# Patient Record
Sex: FEMALE | Race: WHITE | NOT HISPANIC OR LATINO | Employment: FULL TIME | URBAN - METROPOLITAN AREA
[De-identification: names, ages, dates, MRNs, and addresses within clinical notes are randomized per-mention and may not be internally consistent; named-entity substitution may affect disease eponyms.]

---

## 2017-04-24 ENCOUNTER — TELEPHONE (OUTPATIENT)
Dept: FAMILY MEDICINE | Facility: CLINIC | Age: 52
End: 2017-04-24

## 2017-04-24 DIAGNOSIS — Z12.11 SCREEN FOR COLON CANCER: Primary | ICD-10-CM

## 2017-05-02 ENCOUNTER — OFFICE VISIT (OUTPATIENT)
Dept: FAMILY MEDICINE | Facility: CLINIC | Age: 52
End: 2017-05-02
Payer: COMMERCIAL

## 2017-05-02 VITALS
TEMPERATURE: 97.1 F | WEIGHT: 200 LBS | BODY MASS INDEX: 32.14 KG/M2 | SYSTOLIC BLOOD PRESSURE: 132 MMHG | DIASTOLIC BLOOD PRESSURE: 70 MMHG | HEIGHT: 66 IN | OXYGEN SATURATION: 98 % | HEART RATE: 71 BPM

## 2017-05-02 DIAGNOSIS — R07.0 THROAT PAIN: Primary | ICD-10-CM

## 2017-05-02 LAB
DEPRECATED S PYO AG THROAT QL EIA: NORMAL
MICRO REPORT STATUS: NORMAL
SPECIMEN SOURCE: NORMAL

## 2017-05-02 PROCEDURE — 87081 CULTURE SCREEN ONLY: CPT | Performed by: FAMILY MEDICINE

## 2017-05-02 PROCEDURE — 87880 STREP A ASSAY W/OPTIC: CPT | Performed by: FAMILY MEDICINE

## 2017-05-02 PROCEDURE — 99213 OFFICE O/P EST LOW 20 MIN: CPT | Performed by: FAMILY MEDICINE

## 2017-05-02 NOTE — LETTER
[facility]  [address] [facility] [place] [address]    May 3, 2017    Yessica SCHNEIDER Sabine  [address] [place], [place] [address]E [facility] [address]          Dear Yessica,  Your strep culture was negative. I wish you well.   Enclosed is a copy of your results.     Results for orders placed or performed in visit on 05/02/17   Rapid strep screen   Result Value Ref Range    Specimen Description Throat     Rapid Strep A Screen       NEGATIVE: No Group A streptococcal antigen detected by immunoassay, await   culture report.      Micro Report Status FINAL 05/02/2017    Beta strep group A culture   Result Value Ref Range    Specimen Description Throat     Culture Micro No Beta Streptococcus isolated     Micro Report Status FINAL 05/03/2017        If you have any questions or concerns, please call myself or my nurse at [phone].      Sincerely,        Jacquelin Nunez MD/pb

## 2017-05-02 NOTE — NURSING NOTE
"Chief Complaint   Patient presents with     Pharyngitis       Initial /70 (BP Location: Right arm, Patient Position: Chair, Cuff Size: Adult Large)  Pulse 71  Temp 97.1  F (36.2  C)  Ht 5' 6\" (1.676 m)  Wt 200 lb (90.7 kg)  LMP 12/20/2010  SpO2 98%  BMI 32.28 kg/m2 Estimated body mass index is 32.28 kg/(m^2) as calculated from the following:    Height as of this encounter: 5' 6\" (1.676 m).    Weight as of this encounter: 200 lb (90.7 kg).  Medication Reconciliation: complete   Coty Curiel MA      "

## 2017-05-02 NOTE — LETTER
[facility]  [address] [place] [address]  [phone]      May 2, 2017      Yessica Regalado  [address] [facility] [address]        Re Yessica:    Patient was seen by me today. Please excuse her from work today.           Sincerely,      BETH AVALOS M.D.

## 2017-05-02 NOTE — MR AVS SNAPSHOT
After Visit Summary   5/2/2017    Yessica Regalado    MRN: [id number]           Patient Information     Date Of Birth          1965        Visit Information        Provider Department      5/2/2017 9:45 AM Jacquelin Nunez MD [facility]        Today's Diagnoses     Throat pain    -  1      Care Instructions    [facility]    If you have any questions regarding to your visit please contact your care team:       Team Purple:   Clinic Hours Telephone Number   JACKIE Soriano Dr., Dr.   7am-7pm  Monday - Thursday   7am-5pm  Fridays  [phone]  (Appointment scheduling available 24/7)    Questions about your Visit?   Team Line:  [phone]   Urgent Care - [place] and [facility] - 11am-9pm Monday-Friday Saturday-Sunday- 9am-5pm   [place] - 5pm-9pm Monday-Friday Saturday-Sunday- 9am-5pm  [phone] - [facility]  [phone] - [place]       What options do I have for visits at the clinic other than the traditional office visit?  To expand how we care for you, many of our providers are utilizing electronic visits (e-visits) and telephone visits, when medically appropriate, for interactions with their patients rather than a visit in the clinic.   We also offer nurse visits for many medical concerns. Just like any other service, we will bill your insurance company for this type of visit based on time spent on the phone with your provider. Not all insurance companies cover these visits. Please check with your medical insurance if this type of visit is covered. You will be responsible for any charges that are not paid by your insurance.      E-visits via StandDesk:  generally incur a $35.00 fee.  Telephone visits:  Time spent on the phone: *charged based on time that is spent on the phone in increments of 10 minutes. Estimated cost:   5-10 mins $30.00   11-20 mins. $59.00   21-30 mins. $85.00     Use  "MyChart (secure email communication and access to your chart) to send your primary care provider a message or make an appointment. Ask someone on your Team how to sign up for MobileHandshakehart.  For a Price Quote for your services, please call our Consumer Price Line at [phone].  As always, Thank you for trusting us with your health care needs!            Follow-ups after your visit        Who to contact     If you have questions or need follow up information about today's clinic visit or your schedule please contact [facility] MARY ALICE directly at [phone].  Normal or non-critical lab and imaging results will be communicated to you by MyChart, letter or phone within 4 business days after the clinic has received the results. If you do not hear from us within 7 days, please contact the clinic through MobileHandshakehart or phone. If you have a critical or abnormal lab result, we will notify you by phone as soon as possible.  Submit refill requests through KIXEYE or call your pharmacy and they will forward the refill request to us. Please allow 3 business days for your refill to be completed.          Additional Information About Your Visit        MyChart Information     MobileHandshakehart lets you send messages to your doctor, view your test results, renew your prescriptions, schedule appointments and more. To sign up, go to [email or web address] . Click on \"Log in\" on the left side of the screen, which will take you to the Welcome page. Then click on \"Sign up Now\" on the right side of the page.     You will be asked to enter the access code listed below, as well as some personal information. Please follow the directions to create your username and password.     Your access code is: [email or web address]  Expires: 2017 10:07 AM     Your access code will  in 90 days. If you need help or a new code, please call your [facility] or [phone].        Care EveryWhere ID     This is your Care EveryWhere ID. This could be used by " "other organizations to access your [facility] medical records  [id number]        Your Vitals Were     Pulse Temperature Height Last Period Pulse Oximetry BMI (Body Mass Index)    71 97.1  F (36.2  C) 5' 6\" (1.676 m) 12/20/2010 98% 32.28 kg/m2       Blood Pressure from Last 3 Encounters:   05/02/17 132/70   11/18/16 120/77   10/07/15 132/83    Weight from Last 3 Encounters:   05/02/17 200 lb (90.7 kg)   11/18/16 195 lb (88.5 kg)   10/07/15 188 lb (85.3 kg)              We Performed the Following     Beta strep group A culture     Rapid strep screen        Primary Care Provider Office Phone # Fax #    Danika Hammond [phone]       [address] [address]        Thank you!     Thank you for choosing [facility]  for your care. Our goal is always to provide you with excellent care. Hearing back from our patients is one way we can continue to improve our services. Please take a few minutes to complete the written survey that you may receive in the mail after your visit with us. Thank you!             Your Updated Medication List - Protect others around you: Learn how to safely use, store and throw away your medicines at www.disposemymeds.org.          This list is accurate as of: 5/2/17 10:07 AM.  Always use your most recent med list.                   Brand Name Dispense Instructions for use    BUSPAR 30 MG Tabs   Generic drug:  BusPIRone HCl      1 TABLET TWICE DAILY       MULTIVITAMIN TABS   OR      1 TABLET DAILY       PAXIL 40 MG tablet   Generic drug:  PARoxetine      1 1/2 TABLETS DAILY       simvastatin 20 MG tablet    ZOCOR    45 tablet    TAKE ONE-HALF TABLET BY MOUTH EVERY NIGHT AT BEDTIME       WELLBUTRIN  MG 24 hr tablet   Generic drug:  buPROPion      1 TABLET DAILY         "

## 2017-05-02 NOTE — PROGRESS NOTES
SUBJECTIVE:                                                    Yessica Regalado is a 52 year old female who presents to clinic today for the following health issues:      ENT Symptoms             Symptoms: cc Present Absent Comment   Fever/Chills   x    Fatigue  x     Muscle Aches  x     Eye Irritation   x    Sneezing   x    Nasal Juice/Drg   x    Sinus Pressure/Pain   x    Loss of smell   x    Dental pain   x    Sore Throat  x     Swollen Glands   x    Ear Pain/Fullness   x    Cough   x    Wheeze   x    Chest Pain   x    Shortness of breath   x    Rash       Other         Symptom duration:  2 weeks off and on   Symptom severity:  severe   Treatments tried:  none   Contacts:  no              Problem list and histories reviewed & adjusted, as indicated.  Additional history: as documented    Patient Active Problem List   Diagnosis     Hyperlipidemia LDL goal <160     Moderate major depression (H)     Anxiety     Lipoma     Tobacco abuse     Elevated blood pressure reading without diagnosis of hypertension     Past Surgical History:   Procedure Laterality Date     ARTHROSCOPY KNEE RT/LT  07/99    right     C NONSPECIFIC PROCEDURE      elbow fracture ORIF     C NONSPECIFIC PROCEDURE  2002    cyst removal Left forearm     HYSTERECTOMY, PAP NO LONGER INDICATED      Menorrhagia     HYSTERECTOMY, VAGINAL  12/11    partial. no cancer.     TONSILLECTOMY & ADENOIDECTOMY         Social History   Substance Use Topics     Smoking status: Former Smoker     Packs/day: 1.00     Years: 14.00     Types: Cigarettes     Quit date: 1/1/2017     Smokeless tobacco: Former User      Comment: 9/1/10     Alcohol use No     Family History   Problem Relation Age of Onset     Hypertension Mother      DIABETES Mother      Depression Mother      Lipids Mother      OSTEOPOROSIS Mother      Hypertension Father      DIABETES Father      Lipids Father      DIABETES Maternal Grandmother      CANCER Paternal Grandfather      BONE     Allergies Son       "Asthma Son      Allergies Daughter          Current Outpatient Prescriptions   Medication Sig Dispense Refill     simvastatin (ZOCOR) 20 MG tablet TAKE ONE-HALF TABLET BY MOUTH EVERY NIGHT AT BEDTIME 45 tablet 3     WELLBUTRIN XL# 300 MG OR TB24 1 TABLET DAILY       BUSPAR 30 MG OR TABS 1 TABLET TWICE DAILY       PAXIL 40 MG OR TABS  1 1/2 TABLETS DAILY       MULTIVITAMIN TABS   OR 1 TABLET DAILY       BP Readings from Last 3 Encounters:   05/02/17 132/70   11/18/16 120/77   10/07/15 132/83    Wt Readings from Last 3 Encounters:   05/02/17 200 lb (90.7 kg)   11/18/16 195 lb (88.5 kg)   10/07/15 188 lb (85.3 kg)                  Labs reviewed in EPIC    Reviewed and updated as needed this visit by clinical staff  Tobacco  Allergies  Meds  Med Hx  Surg Hx  Fam Hx  Soc Hx      Reviewed and updated as needed this visit by Provider         ROS:  This 52 year old female is here today because she has had a sore throat for awhile. Seems worse the past 24 hours. No fevers. She works in [employer]. No aware of any seasonal allergies. Able to swallow. All other review of systems are negative  Personal, family, and social history reviewed with patient and revised.         OBJECTIVE:                                                    /70 (BP Location: Right arm, Patient Position: Chair, Cuff Size: Adult Large)  Pulse 71  Temp 97.1  F (36.2  C)  Ht 5' 6\" (1.676 m)  Wt 200 lb (90.7 kg)  LMP 12/20/2010  SpO2 98%  BMI 32.28 kg/m2  Body mass index is 32.28 kg/(m^2).  GENERAL: healthy, alert and no distress  EYES: Eyes grossly normal to inspection, PERRL and conjunctivae and sclerae normal  HENT: ear canals and TM's normal, nose and mouth without ulcers or lesions, but her uvula is a little red and swollen  NECK: no adenopathy, no asymmetry, masses, or scars and thyroid normal to palpation  RESP: lungs clear to auscultation - no rales, rhonchi or wheezes  CV: regular rate and rhythm, normal S1 S2, no S3 or S4, no " murmur, click or rub, no peripheral edema   MS: no gross musculoskeletal defects noted, no edema    Diagnostic Test Results:  Results for orders placed or performed in visit on 05/02/17 (from the past 24 hour(s))   Rapid strep screen   Result Value Ref Range    Specimen Description Throat     Rapid Strep A Screen       NEGATIVE: No Group A streptococcal antigen detected by immunoassay, await   culture report.      Micro Report Status FINAL 05/02/2017         ASSESSMENT/PLAN:                                                             1. Throat pain  As above, could be related to allergies or a viral illenss  - Rapid strep screen  - Beta strep group A culture  Off work today    Return to clinic if no improvement     BETH AVALOS MD  [facility]

## 2017-05-02 NOTE — PATIENT INSTRUCTIONS
[facility]    If you have any questions regarding to your visit please contact your care team:       Team Purple:   Clinic Hours Telephone Number   JACKIE Soriano Dr., Dr.   7am-7pm  Monday - Thursday   7am-5pm  Fridays  [phone]  (Appointment scheduling available 24/7)    Questions about your Visit?   Team Line:  [phone]   Urgent Care - [place] and [facility] - 11am-9pm Monday-Friday Saturday-Sunday- 9am-5pm   [place] - 5pm-9pm Monday-Friday Saturday-Sunday- 9am-5pm  [phone] - [facility]  [phone] - [place]       What options do I have for visits at the clinic other than the traditional office visit?  To expand how we care for you, many of our providers are utilizing electronic visits (e-visits) and telephone visits, when medically appropriate, for interactions with their patients rather than a visit in the clinic.   We also offer nurse visits for many medical concerns. Just like any other service, we will bill your insurance company for this type of visit based on time spent on the phone with your provider. Not all insurance companies cover these visits. Please check with your medical insurance if this type of visit is covered. You will be responsible for any charges that are not paid by your insurance.      E-visits via [employer]:  generally incur a $35.00 fee.  Telephone visits:  Time spent on the phone: *charged based on time that is spent on the phone in increments of 10 minutes. Estimated cost:   5-10 mins $30.00   11-20 mins. $59.00   21-30 mins. $85.00     Use UClasst (secure email communication and access to your chart) to send your primary care provider a message or make an appointment. Ask someone on your Team how to sign up for [employer].  For a Price Quote for your services, please call our Consumer Price Line at [phone].  As always, Thank you for trusting us with your health care needs!

## 2017-05-03 LAB
BACTERIA SPEC CULT: NORMAL
MICRO REPORT STATUS: NORMAL
SPECIMEN SOURCE: NORMAL

## 2017-05-03 ASSESSMENT — PATIENT HEALTH QUESTIONNAIRE - PHQ9: SUM OF ALL RESPONSES TO PHQ QUESTIONS 1-9: 1

## 2017-09-28 ENCOUNTER — TELEPHONE (OUTPATIENT)
Dept: FAMILY MEDICINE | Facility: CLINIC | Age: 52
End: 2017-09-28

## 2017-09-28 NOTE — TELEPHONE ENCOUNTER
Panel Management Review      Patient has the following on her problem list: None      Composite cancer screening  Chart review shows that this patient is due/due soon for the following Colonoscopy and Fecal Colorectal (FIT)  Summary:    Patient is due/failing the following:   COLONOSCOPY and FIT    Action needed:   FIT test was mailed to patient.     Type of outreach:    None    Questions for provider review:    None                                                                                                                                    Erin Hook MA        Chart routed to  .

## 2017-11-29 ENCOUNTER — TELEPHONE (OUTPATIENT)
Dept: FAMILY MEDICINE | Facility: CLINIC | Age: 52
End: 2017-11-29

## 2017-11-29 NOTE — LETTER
November 29, 2017          Yessica Regalado,  [address] [facility] [address]        Dear Yessica Regalado      Monitoring and managing your preventative and chronic health conditions are very important to us. Our records indicate that you have not scheduled for FIT test and Depression Check  which was recommended by Danika Brand.      If you have received your health care elsewhere, please call the clinic so the information can be documented in your chart.    Please call [phone] or message us through your [employer] account to schedule an appointment or provide information for your chart.     Feel free to contact us if you have any questions or concerns!    I look forward to seeing you and working with you on your health care needs.     Sincerely,         Danika Hammond / Stone Hewitt

## 2017-11-29 NOTE — TELEPHONE ENCOUNTER
Panel Management Review      Patient has the following on her problem list: None      Composite cancer screening  Chart review shows that this patient is due/due soon for the following Fecal Colorectal (FIT)  Summary:    Patient is due/failing the following:   FIT and PHQ9    Action needed:   Routed to provider for review.    Type of outreach:    Sent letter.    Questions for provider review:    None                                                                                                                                    Stone Hewitt       Chart routed to Care Team .

## 2017-11-29 NOTE — PATIENT INSTRUCTIONS
[facility]    If you have any questions regarding to your visit please contact your care team:       Team Red:   Clinic Hours Telephone Number   Dr. Kristi Munguia  (pediatrics)  Vicki Villalta NP 7am-7pm  Monday - Thursday   7am-5pm  Fridays  [phone] (fax)    Frank BOLTON  [phone]   Urgent Care - [place] and [place] Monday-Friday  [place] - 11am-8pm  Saturday-Sunday  Both sites - 9am-5pm  [phone] - [facility]  [phone] - [place]       What options do I have for visits at the clinic other than the traditional office visit?  To expand how we care for you, many of our providers are utilizing electronic visits (e-visits) and telephone visits, when medically appropriate, for interactions with their patients rather than a visit in the clinic.   We also offer nurse visits for many medical concerns. Just like any other service, we will bill your insurance company for this type of visit based on time spent on the phone with your provider. Not all insurance companies cover these visits. Please check with your medical insurance if this type of visit is covered. You will be responsible for any charges that are not paid by your insurance.      E-visits via Opt[employer]ron:  generally incur a $35.00 fee.  Telephone visits:  Time spent on the phone: *charged based on time that is spent on the phone in increments of 10 minutes. Estimated cost:   5-10 mins $30.00   11-20 mins. $59.00   21-30 mins. $85.00     As always, Thank you for trusting us with your health care needs!              Preventive Health Recommendations  Female Ages 50 - 64    Yearly exam: See your health care provider every year in order to  o Review health changes.   o Discuss preventive care.    o Review your medicines if your doctor has prescribed any.      Get a Pap test every three years (unless you have an abnormal result and your provider advises testing more  often).    If you get Pap tests with HPV test, you only need to test every 5 years, unless you have an abnormal result.     You do not need a Pap test if your uterus was removed (hysterectomy) and you have not had cancer.    You should be tested each year for STDs (sexually transmitted diseases) if you're at risk.     Have a mammogram every 1 to 2 years.    Have a colonoscopy at age 50, or have a yearly FIT test (stool test). These exams screen for colon cancer.      Have a cholesterol test every 5 years, or more often if advised.    Have a diabetes test (fasting glucose) every three years. If you are at risk for diabetes, you should have this test more often.     If you are at risk for osteoporosis (brittle bone disease), think about having a bone density scan (DEXA).    Shots: Get a flu shot each year. Get a tetanus shot every 10 years.    Nutrition:     Eat at least 5 servings of fruits and vegetables each day.    Eat whole-grain bread, whole-wheat pasta and brown rice instead of white grains and rice.    Talk to your provider about Calcium and Vitamin D.     Lifestyle    Exercise at least 150 minutes a week (30 minutes a day, 5 days a week). This will help you control your weight and prevent disease.    Limit alcohol to one drink per day.    No smoking.     Wear sunscreen to prevent skin cancer.     See your dentist every six months for an exam and cleaning.    See your eye doctor every 1 to 2 years.  Discharged by OTTONIEL Ahmadi

## 2017-12-01 ENCOUNTER — OFFICE VISIT (OUTPATIENT)
Dept: FAMILY MEDICINE | Facility: CLINIC | Age: 52
End: 2017-12-01
Payer: COMMERCIAL

## 2017-12-01 ENCOUNTER — RADIANT APPOINTMENT (OUTPATIENT)
Dept: MAMMOGRAPHY | Facility: CLINIC | Age: 52
End: 2017-12-01
Payer: COMMERCIAL

## 2017-12-01 VITALS
SYSTOLIC BLOOD PRESSURE: 122 MMHG | HEART RATE: 83 BPM | HEIGHT: 66 IN | RESPIRATION RATE: 15 BRPM | WEIGHT: 203 LBS | BODY MASS INDEX: 32.62 KG/M2 | DIASTOLIC BLOOD PRESSURE: 80 MMHG | OXYGEN SATURATION: 96 % | TEMPERATURE: 97.1 F

## 2017-12-01 DIAGNOSIS — E78.5 HYPERLIPIDEMIA LDL GOAL <160: ICD-10-CM

## 2017-12-01 DIAGNOSIS — Z12.31 VISIT FOR SCREENING MAMMOGRAM: ICD-10-CM

## 2017-12-01 DIAGNOSIS — Z00.00 ROUTINE GENERAL MEDICAL EXAMINATION AT A HEALTH CARE FACILITY: Primary | ICD-10-CM

## 2017-12-01 DIAGNOSIS — Z87.891 EX-SMOKER: ICD-10-CM

## 2017-12-01 DIAGNOSIS — Z12.11 SCREEN FOR COLON CANCER: ICD-10-CM

## 2017-12-01 DIAGNOSIS — Z23 NEED FOR PROPHYLACTIC VACCINATION AND INOCULATION AGAINST INFLUENZA: ICD-10-CM

## 2017-12-01 LAB
CHOLEST SERPL-MCNC: 198 MG/DL
GLUCOSE SERPL-MCNC: 99 MG/DL (ref 70–99)
HDLC SERPL-MCNC: 60 MG/DL
LDLC SERPL CALC-MCNC: 107 MG/DL
NONHDLC SERPL-MCNC: 138 MG/DL
TRIGL SERPL-MCNC: 156 MG/DL

## 2017-12-01 PROCEDURE — 82947 ASSAY GLUCOSE BLOOD QUANT: CPT | Performed by: FAMILY MEDICINE

## 2017-12-01 PROCEDURE — 80061 LIPID PANEL: CPT | Performed by: FAMILY MEDICINE

## 2017-12-01 PROCEDURE — 99396 PREV VISIT EST AGE 40-64: CPT | Performed by: FAMILY MEDICINE

## 2017-12-01 PROCEDURE — G0202 SCR MAMMO BI INCL CAD: HCPCS | Mod: TC

## 2017-12-01 PROCEDURE — 36415 COLL VENOUS BLD VENIPUNCTURE: CPT | Performed by: FAMILY MEDICINE

## 2017-12-01 RX ORDER — SIMVASTATIN 20 MG
TABLET ORAL
Qty: 45 TABLET | Refills: 3 | Status: SHIPPED | OUTPATIENT
Start: 2017-12-01 | End: 2018-03-26

## 2017-12-01 ASSESSMENT — PATIENT HEALTH QUESTIONNAIRE - PHQ9
SUM OF ALL RESPONSES TO PHQ QUESTIONS 1-9: 0
5. POOR APPETITE OR OVEREATING: NOT AT ALL

## 2017-12-01 ASSESSMENT — ANXIETY QUESTIONNAIRES
7. FEELING AFRAID AS IF SOMETHING AWFUL MIGHT HAPPEN: NOT AT ALL
1. FEELING NERVOUS, ANXIOUS, OR ON EDGE: NOT AT ALL
5. BEING SO RESTLESS THAT IT IS HARD TO SIT STILL: NOT AT ALL
3. WORRYING TOO MUCH ABOUT DIFFERENT THINGS: NOT AT ALL
IF YOU CHECKED OFF ANY PROBLEMS ON THIS QUESTIONNAIRE, HOW DIFFICULT HAVE THESE PROBLEMS MADE IT FOR YOU TO DO YOUR WORK, TAKE CARE OF THINGS AT HOME, OR GET ALONG WITH OTHER PEOPLE: NOT DIFFICULT AT ALL
2. NOT BEING ABLE TO STOP OR CONTROL WORRYING: NOT AT ALL
6. BECOMING EASILY ANNOYED OR IRRITABLE: NOT AT ALL
GAD7 TOTAL SCORE: 0

## 2017-12-01 NOTE — LETTER
[facility]  [address] [place] [address]    December 4, 2017    Yessica SCHNEIDER Sabine  [address] [facility] [address]          Dear Yessica,    Cholesterol and Blood sugar is good   Take care    Enclosed is a copy of your results.     Results for orders placed or performed in visit on 12/01/17   Lipid panel reflex to direct LDL Fasting   Result Value Ref Range    Cholesterol 198 <200 mg/dL    Triglycerides 156 (H) <150 mg/dL    HDL Cholesterol 60 >49 mg/dL    LDL Cholesterol Calculated 107 (H) <100 mg/dL    Non HDL Cholesterol 138 (H) <130 mg/dL   Glucose   Result Value Ref Range    Glucose 99 70 - 99 mg/dL       If you have any questions or concerns, please call myself or my nurse at [phone].      Sincerely,        Danika Hammond MD/mckinley

## 2017-12-01 NOTE — PROGRESS NOTES
SUBJECTIVE:   CC: Yessica Regalado is an 52 year old woman who presents for preventive health visit.     Healthy Habits:    Do you get at least three servings of calcium containing foods daily (dairy, green leafy vegetables, etc.)? yes    Amount of exercise or daily activities, outside of work: no    Problems taking medications regularly No    Medication side effects: No    Have you had an eye exam in the past two years? yes    Do you see a dentist twice per year? yes    Do you have sleep apnea, excessive snoring or daytime drowsiness?no        Depression is stable -she sees Psych-see PHQ9  She is doing well with statins  No muscle aches      Today's PHQ-2 Score: PHQ-2 ( 1999 Pfizer) 5/2/2017 10/7/2015   Q1: Little interest or pleasure in doing things 0 0   Q2: Feeling down, depressed or hopeless 0 0   PHQ-2 Score 0 0         Abuse: Current or Past(Physical, Sexual or Emotional)- No  Do you feel safe in your environment - Yes  Social History   Substance Use Topics     Smoking status: Former Smoker     Packs/day: 1.00     Years: 14.00     Types: Cigarettes     Quit date: 1/1/2017     Smokeless tobacco: Former User      Comment: 9/1/10     Alcohol use No     The patient does not drink >3 drinks per day nor >7 drinks per week.    Reviewed orders with patient.  Reviewed health maintenance and updated orders accordingly - Yes  Labs reviewed in EPIC  BP Readings from Last 3 Encounters:   12/01/17 122/80   05/02/17 132/70   11/18/16 120/77    Wt Readings from Last 3 Encounters:   12/01/17 203 lb (92.1 kg)   05/02/17 200 lb (90.7 kg)   11/18/16 195 lb (88.5 kg)                  Patient Active Problem List   Diagnosis     Hyperlipidemia LDL goal <160     Moderate major depression (H)     Anxiety     Lipoma     Elevated blood pressure reading without diagnosis of hypertension     Ex-smoker     Past Surgical History:   Procedure Laterality Date     ARTHROSCOPY KNEE RT/LT  07/99    right     C NONSPECIFIC PROCEDURE      elbow  fracture ORIF     C NONSPECIFIC PROCEDURE  2002    cyst removal Left forearm     HYSTERECTOMY, PAP NO LONGER INDICATED      Menorrhagia     HYSTERECTOMY, VAGINAL  12/11    partial. no cancer.     TONSILLECTOMY & ADENOIDECTOMY         Social History   Substance Use Topics     Smoking status: Former Smoker     Packs/day: 1.00     Years: 14.00     Types: Cigarettes     Quit date: 1/1/2017     Smokeless tobacco: Former User      Comment: 9/1/10     Alcohol use No     Family History   Problem Relation Age of Onset     Hypertension Mother      DIABETES Mother      Depression Mother      Lipids Mother      OSTEOPOROSIS Mother      Hypertension Father      DIABETES Father      Lipids Father      DIABETES Maternal Grandmother      CANCER Paternal Grandfather      BONE     Allergies Son      Asthma Son      Allergies Daughter          Current Outpatient Prescriptions   Medication Sig Dispense Refill     simvastatin (ZOCOR) 20 MG tablet TAKE ONE-HALF TABLET BY MOUTH EVERY NIGHT AT BEDTIME 45 tablet 3     WELLBUTRIN XL# 300 MG OR TB24 1 TABLET DAILY       BUSPAR 30 MG OR TABS 1 TABLET TWICE DAILY       PAXIL 40 MG OR TABS  1 1/2 TABLETS DAILY       MULTIVITAMIN TABS   OR 1 TABLET DAILY       No Known Allergies  Recent Labs   Lab Test  11/18/16   0805  09/30/15   0717  09/17/14   0700  03/27/13   0702  02/21/12   0739   LDL  118*  134*  133*  116  147*   HDL  48*  49*  47*  44*  41*   TRIG  175*  172*  149  136  160*   ALT  37  43  35  42  35   TSH   --    --    --   1.34  1.41              Patient over age 50, mutual decision to screen reflected in health maintenance.      Pertinent mammograms are reviewed under the imaging tab.  History of abnormal Pap smear: NO - age 30- 65 PAP every 3 years recommended    Reviewed and updated as needed this visit by clinical staffTobacco  Allergies  Meds  Problems         Reviewed and updated as needed this visit by Provider        Past Medical History:   Diagnosis Date     Alcoholic (H)  "    recovering sober since      Anxiety     sees NP psychiatry in [place].     Ex-cigarette smoker      Hyperlipidemia LDL goal <160      Lipoma      Moderate major depression (H)     sees NP psychiatry in [place].      (normal spontaneous vaginal delivery)       Past Surgical History:   Procedure Laterality Date     ARTHROSCOPY KNEE RT/LT      right     C NONSPECIFIC PROCEDURE      elbow fracture ORIF     C NONSPECIFIC PROCEDURE      cyst removal Left forearm     HYSTERECTOMY, PAP NO LONGER INDICATED      Menorrhagia     HYSTERECTOMY, VAGINAL      partial. no cancer.     TONSILLECTOMY & ADENOIDECTOMY         ROS:  C: NEGATIVE for fever, chills, change in weight  I: NEGATIVE for worrisome rashes, moles or lesions  E: NEGATIVE for vision changes or irritation  ENT: NEGATIVE for ear, mouth and throat problems  R: NEGATIVE for significant cough or SOB  B: NEGATIVE for masses, tenderness or discharge  CV: NEGATIVE for chest pain, palpitations or peripheral edema  GI: NEGATIVE for nausea, abdominal pain, heartburn, or change in bowel habits  : NEGATIVE for unusual urinary or vaginal symptoms. No vaginal bleeding.  M: NEGATIVE for significant arthralgias or myalgia  N: NEGATIVE for weakness, dizziness or paresthesias  P: NEGATIVE for changes in mood or affect     OBJECTIVE:   Pulse 83  Temp 97.1  F (36.2  C)  Resp 15  Ht 5' 6\" (1.676 m)  Wt 203 lb (92.1 kg)  LMP 2010  SpO2 96%  BMI 32.77 kg/m2  EXAM:  GENERAL: healthy, alert and no distress  EYES: Eyes grossly normal to inspection, PERRL and conjunctivae and sclerae normal  HENT: ear canals and TM's normal, nose and mouth without ulcers or lesions  NECK: no adenopathy, no asymmetry, masses, or scars and thyroid normal to palpation  RESP: lungs clear to auscultation - no rales, rhonchi or wheezes  BREAST: normal without masses, tenderness or nipple discharge and no palpable axillary masses or adenopathy  CV: regular rate and " "rhythm, normal S1 S2, no S3 or S4, no murmur, click or rub, no peripheral edema and peripheral pulses strong  ABDOMEN: soft, nontender, no hepatosplenomegaly, no masses and bowel sounds normal  MS: no gross musculoskeletal defects noted, no edema  SKIN: no suspicious lesions or rashes  NEURO: Normal strength and tone, mentation intact and speech normal  PSYCH: mentation appears normal, affect normal/bright    ASSESSMENT/PLAN:   1. Routine general medical examination at a health care facility  Normal     2. Screen for colon cancer  Advised   Pt not sure if she will do colonoscopy  Advised at least do FITannually  - Fecal colorectal cancer screen (FIT); Future  - GASTROENTEROLOGY ADULT REF PROCEDURE ONLY    3. Visit for screening mammogram  Pt has had mammo    4. Need for prophylactic vaccination and inoculation against influenza  Pt declined    5. Hyperlipidemia LDL goal <160  Labs pending   - simvastatin (ZOCOR) 20 MG tablet; TAKE ONE-HALF TABLET BY MOUTH EVERY NIGHT AT BEDTIME  Dispense: 45 tablet; Refill: 3  - Lipid panel reflex to direct LDL Fasting  - Glucose    6. Ex-smoker        COUNSELING:   Reviewed preventive health counseling, as reflected in patient instructions       Regular exercise       Healthy diet/nutrition       Vision screening       Hearing screening       Immunizations    Declined: Influenza due to Conscientious objector             Osteoporosis Prevention/Bone Health       Colon cancer screening           reports that she quit smoking about 10 months ago. Her smoking use included Cigarettes. She has a 14.00 pack-year smoking history. She has quit using smokeless tobacco.    Estimated body mass index is 32.77 kg/(m^2) as calculated from the following:    Height as of this encounter: 5' 6\" (1.676 m).    Weight as of this encounter: 203 lb (92.1 kg).   Weight management plan: low bianca diet/Exercise    Counseling Resources:  ATP IV Guidelines  Pooled Cohorts Equation Calculator  Breast Cancer Risk " Calculator  FRAX Risk Assessment  ICSI Preventive Guidelines  Dietary Guidelines for Americans, 2010  USDA's MyPlate  ASA Prophylaxis  Lung CA Screening    Danika Hammond MD  [facility]

## 2017-12-01 NOTE — NURSING NOTE
"Chief Complaint   Patient presents with     Physical       Initial /80  Pulse 83  Temp 97.1  F (36.2  C)  Resp 15  Ht 5' 6\" (1.676 m)  Wt 203 lb (92.1 kg)  LMP 12/20/2010  SpO2 96%  BMI 32.77 kg/m2 Estimated body mass index is 32.77 kg/(m^2) as calculated from the following:    Height as of this encounter: 5' 6\" (1.676 m).    Weight as of this encounter: 203 lb (92.1 kg).  Medication Reconciliation: complete     Radha Rodarte. MA      "

## 2017-12-01 NOTE — MR AVS SNAPSHOT
After Visit Summary   12/1/2017    Yessica Regalado    MRN: [id number]           Patient Information     Date Of Birth          1965        Visit Information        Provider Department      12/1/2017 8:00 AM Danika Hammond MD [facility]        Today's Diagnoses     Routine general medical examination at a health care facility    -  1    Screen for colon cancer        Visit for screening mammogram        Need for prophylactic vaccination and inoculation against influenza        Hyperlipidemia LDL goal <160        Ex-smoker          Care Instructions    [facility]    If you have any questions regarding to your visit please contact your care team:       Team Red:   Clinic Hours Telephone Number   Dr. Kristi Munguia  (pediatrics)  Vicki Villalta NP 7am-7pm  Monday - Thursday   7am-5pm  Fridays  [phone] (fax)    Frank BOLTON  [phone]   Urgent Care - [place] and [place] Monday-Friday  [place] - 11am-8pm  Saturday-Sunday  Both sites - 9am-5pm  [phone] - [facility]  [phone] - [place]       What options do I have for visits at the clinic other than the traditional office visit?  To expand how we care for you, many of our providers are utilizing electronic visits (e-visits) and telephone visits, when medically appropriate, for interactions with their patients rather than a visit in the clinic.   We also offer nurse visits for many medical concerns. Just like any other service, we will bill your insurance company for this type of visit based on time spent on the phone with your provider. Not all insurance companies cover these visits. Please check with your medical insurance if this type of visit is covered. You will be responsible for any charges that are not paid by your insurance.      E-visits via [employer]:  generally incur a $35.00 fee.  Telephone visits:  Time spent on the phone: *charged based on  time that is spent on the phone in increments of 10 minutes. Estimated cost:   5-10 mins $30.00   11-20 mins. $59.00   21-30 mins. $85.00     As always, Thank you for trusting us with your health care needs!              Preventive Health Recommendations  Female Ages 50 - 64    Yearly exam: See your health care provider every year in order to  o Review health changes.   o Discuss preventive care.    o Review your medicines if your doctor has prescribed any.      Get a Pap test every three years (unless you have an abnormal result and your provider advises testing more often).    If you get Pap tests with HPV test, you only need to test every 5 years, unless you have an abnormal result.     You do not need a Pap test if your uterus was removed (hysterectomy) and you have not had cancer.    You should be tested each year for STDs (sexually transmitted diseases) if you're at risk.     Have a mammogram every 1 to 2 years.    Have a colonoscopy at age 50, or have a yearly FIT test (stool test). These exams screen for colon cancer.      Have a cholesterol test every 5 years, or more often if advised.    Have a diabetes test (fasting glucose) every three years. If you are at risk for diabetes, you should have this test more often.     If you are at risk for osteoporosis (brittle bone disease), think about having a bone density scan (DEXA).    Shots: Get a flu shot each year. Get a tetanus shot every 10 years.    Nutrition:     Eat at least 5 servings of fruits and vegetables each day.    Eat whole-grain bread, whole-wheat pasta and brown rice instead of white grains and rice.    Talk to your provider about Calcium and Vitamin D.     Lifestyle    Exercise at least 150 minutes a week (30 minutes a day, 5 days a week). This will help you control your weight and prevent disease.    Limit alcohol to one drink per day.    No smoking.     Wear sunscreen to prevent skin cancer.     See your dentist every six months for an exam and  cleaning.    See your eye doctor every 1 to 2 years.  Discharged by OTTONIEL Ahmadi            Follow-ups after your visit        Additional Services     GASTROENTEROLOGY ADULT REF PROCEDURE ONLY       Last Lab Result: No results found for: CR  Body mass index is 32.77 kg/(m^2).     Needed:  No  Language:  English    Patient will be contacted to schedule procedure.     Please be aware that coverage of these services is subject to the terms and limitations of your health insurance plan.  Call member services at your health plan with any benefit or coverage questions.  Any procedures must be performed at a [facility] facility OR coordinated by your clinic's referral office.    Please bring the following with you to your appointment:    (1) Any X-Rays, CTs or MRIs which have been performed.  Contact the facility where they were done to arrange for  prior to your scheduled appointment.    (2) List of current medications   (3) This referral request   (4) Any documents/labs given to you for this referral                  Future tests that were ordered for you today     Open Future Orders        Priority Expected Expires Ordered    Fecal colorectal cancer screen (FIT) Routine 12/20/2017 2/21/2018 12/1/2017            Who to contact     If you have questions or need follow up information about today's clinic visit or your schedule please contact [facility] directly at [phone].  Normal or non-critical lab and imaging results will be communicated to you by MyChart, letter or phone within 4 business days after the clinic has received the results. If you do not hear from us within 7 days, please contact the clinic through MyChart or phone. If you have a critical or abnormal lab result, we will notify you by phone as soon as possible.  Submit refill requests through [employer] or call your pharmacy and they will forward the refill request to us. Please allow 3 business days for your refill to be  "completed.          Additional Information About Your Visit        [email or web address] Information     [employer] lets you send messages to your doctor, view your test results, renew your prescriptions, schedule appointments and more. To sign up, go to [email or web address] . Click on \"Log in\" on the left side of the screen, which will take you to the Welcome page. Then click on \"Sign up Now\" on the right side of the page.     You will be asked to enter the access code listed below, as well as some personal information. Please follow the directions to create your username and password.     Your access code is: [email or web address]  Expires: 3/1/2018  8:28 AM     Your access code will  in 90 days. If you need help or a new code, please call your [place] clinic or [phone].        Care EveryWhere ID     This is your Care EveryWhere ID. This could be used by other organizations to access your [place] medical records  [id number]        Your Vitals Were     Pulse Temperature Respirations Height Last Period Pulse Oximetry    83 97.1  F (36.2  C) 15 5' 6\" (1.676 m) 2010 96%    BMI (Body Mass Index)                   32.77 kg/m2            Blood Pressure from Last 3 Encounters:   17 122/80   17 132/70   16 120/77    Weight from Last 3 Encounters:   17 203 lb (92.1 kg)   17 200 lb (90.7 kg)   16 195 lb (88.5 kg)              We Performed the Following     GASTROENTEROLOGY ADULT REF PROCEDURE ONLY     Glucose     Lipid panel reflex to direct LDL Fasting          Where to get your medicines      These medications were sent to [id number] - [place], [place] - [address] AT [facility] [address]  [address], [place] [address]     Phone:  [phone]     simvastatin 20 MG tablet          Primary Care Provider Office Phone # Fax #    Danika Hammond [phone]       [address] [facility] [place] [address]        Equal Access " to Services     RIVER ALVARADO : Nery Garcia, wasita watters, qazenabruno montielalmasuhail wolf. So [facility] [phone].    ATENCIÓN: Si habla barbara, tiene a hobbs disposición servicios gratuitos de asistencia lingüística. Llame al [phone].    We comply with applicable federal civil rights laws and [place] laws. We do not discriminate on the basis of race, color, national origin, age, disability, sex, sexual orientation, or gender identity.            Thank you!     Thank you for choosing [facility]  for your care. Our goal is always to provide you with excellent care. Hearing back from our patients is one way we can continue to improve our services. Please take a few minutes to complete the written survey that you may receive in the mail after your visit with us. Thank you!             Your Updated Medication List - Protect others around you: Learn how to safely use, store and throw away your medicines at www.disposemymeds.org.          This list is accurate as of: 12/1/17  8:28 AM.  Always use your most recent med list.                   Brand Name Dispense Instructions for use Diagnosis    BUSPAR 30 MG Tabs   Generic drug:  BusPIRone HCl      1 TABLET TWICE DAILY        MULTIVITAMIN TABS   OR      1 TABLET DAILY        PAXIL 40 MG tablet   Generic drug:  PARoxetine      1 1/2 TABLETS DAILY        simvastatin 20 MG tablet    ZOCOR    45 tablet    TAKE ONE-HALF TABLET BY MOUTH EVERY NIGHT AT BEDTIME    Hyperlipidemia LDL goal <160       WELLBUTRIN  MG 24 hr tablet   Generic drug:  buPROPion      1 TABLET DAILY

## 2017-12-02 ASSESSMENT — ANXIETY QUESTIONNAIRES: GAD7 TOTAL SCORE: 0

## 2018-03-26 ENCOUNTER — OFFICE VISIT (OUTPATIENT)
Dept: FAMILY MEDICINE | Facility: CLINIC | Age: 53
End: 2018-03-26
Payer: COMMERCIAL

## 2018-03-26 VITALS
OXYGEN SATURATION: 96 % | RESPIRATION RATE: 13 BRPM | HEIGHT: 66 IN | BODY MASS INDEX: 33.23 KG/M2 | TEMPERATURE: 97.9 F | WEIGHT: 206.8 LBS | HEART RATE: 90 BPM

## 2018-03-26 DIAGNOSIS — J06.9 UPPER RESPIRATORY TRACT INFECTION, UNSPECIFIED TYPE: Primary | ICD-10-CM

## 2018-03-26 DIAGNOSIS — R07.0 THROAT PAIN: ICD-10-CM

## 2018-03-26 PROBLEM — Z87.891 EX-SMOKER: Status: RESOLVED | Noted: 2017-12-01 | Resolved: 2018-03-26

## 2018-03-26 LAB
DEPRECATED S PYO AG THROAT QL EIA: NORMAL
SPECIMEN SOURCE: NORMAL

## 2018-03-26 PROCEDURE — 87081 CULTURE SCREEN ONLY: CPT | Performed by: FAMILY MEDICINE

## 2018-03-26 PROCEDURE — 87880 STREP A ASSAY W/OPTIC: CPT | Performed by: FAMILY MEDICINE

## 2018-03-26 PROCEDURE — 99213 OFFICE O/P EST LOW 20 MIN: CPT | Performed by: FAMILY MEDICINE

## 2018-03-26 NOTE — MR AVS SNAPSHOT
After Visit Summary   3/26/2018    Yessica Regalado    MRN: [id number]           Patient Information     Date Of Birth          1965        Visit Information        Provider Department      3/26/2018 2:00 PM Kristi Jaramillo MD [facility]        Today's Diagnoses     Upper respiratory tract infection, unspecified type    -  1    Throat pain          Care Instructions    [facility]    If you have any questions regarding to your visit please contact your care team:       Team Red:   Clinic Hours Telephone Number   Dr. Kristi Villalta, NP   7am-7pm  Monday - Thursday   7am-5pm  Fridays  [phone]  (Appointment scheduling available 24/7)    Questions about your visit?   Team Line  [phone]   Urgent [facility] - [place] [facility] - 11am-9pm Monday-Friday Saturday-Sunday- 9am-5pm   [place] - 5pm-9pm Monday-Friday Saturday-Sunday- 9am-5pm  [phone] - [facility]  [phone] - [place]       What options do I have for visits at the clinic other than the traditional office visit?  To expand how we care for you, many of our providers are utilizing electronic visits (e-visits) and telephone visits, when medically appropriate, for interactions with their patients rather than a visit in the clinic.   We also offer nurse visits for many medical concerns. Just like any other service, we will bill your insurance company for this type of visit based on time spent on the phone with your provider. Not all insurance companies cover these visits. Please check with your medical insurance if this type of visit is covered. You will be responsible for any charges that are not paid by your insurance.      E-visits via mphoria:  generally incur a $35.00 fee.  Telephone visits:  Time spent on the phone: *charged based on time that is spent on the phone in increments of 10 minutes. Estimated cost:   5-10 mins $30.00   11-20  mins. $59.00   21-30 mins. $85.00     Use Gyfthart (secure email communication and access to your chart) to send your primary care provider a message or make an appointment. Ask someone on your Team how to sign up for Vital Connect.  For a Price Quote for your services, please call our Consumer Price Line at [phone].      As always, Thank you for trusting us with your health care needs!    Viral Upper Respiratory Illness (Adult)  You have a viral upper respiratory illness (URI), which is another term for the common cold. This illness is contagious during the first few days. It is spread through the air by coughing and sneezing. It may also be spread by direct contact (touching the sick person and then touching your own eyes, nose, or mouth). Frequent handwashing will decrease risk of spread. Most viral illnesses go away within 7 to 10 days with rest and simple home remedies. Sometimes the illness may last for several weeks. Antibiotics will not kill a virus, and they are generally not prescribed for this condition.    Home care    If symptoms are severe, rest at home for the first 2 to 3 days. When you resume activity, don't let yourself get too tired.    Avoid being exposed to cigarette smoke (yours or others ).    You may use acetaminophen or ibuprofen to control pain and fever, unless another medicine was prescribed. (Note: If you have chronic liver or kidney disease, have ever had a stomach ulcer or gastrointestinal bleeding, or are taking blood-thinning medicines, talk with your healthcare provider before using these medicines.) Aspirin should never be given to anyone under 18 years of age who is ill with a viral infection or fever. It may cause severe liver or brain damage.    Your appetite may be poor, so a light diet is fine. Avoid dehydration by drinking 6 to 8 glasses of fluids per day (water, soft drinks, juices, tea, or soup). Extra fluids will help loosen secretions in the nose and  lungs.    Over-the-counter cold medicines will not shorten the length of time you re sick, but they may be helpful for the following symptoms: cough, sore throat, and nasal and sinus congestion. (Note: Do not use decongestants if you have high blood pressure.)  Follow-up care  Follow up with your healthcare provider, or as advised.  When to seek medical advice  Call your healthcare provider right away if any of these occur:    Cough with lots of colored sputum (mucus)    Severe headache; face, neck, or ear pain    Difficulty swallowing due to throat pain    Fever of 100.4 F (38 C)  Call 911, or get immediate medical care  Call emergency services right away if any of these occur:    Chest pain, shortness of breath, wheezing, or difficulty breathing    Coughing up blood    Inability to swallow due to throat pain  Date Last Reviewed: 9/13/2015 2000-2017 [employer]. [address] [place] [address]. All rights reserved. This information is not intended as a substitute for professional medical care. Always follow your healthcare professional's instructions.    [facility] [place]    If you have any questions regarding to your visit please contact your care team:       Team Red:   Clinic Hours Telephone Number   Dr. Kristi Villalta, NP   7am-7pm  Monday - Thursday   7am-5pm  Fridays  [phone]  (Appointment scheduling available 24/7)    Questions about your visit?   Team Line  [phone]   Urgent Care - [place] - 11am-9pm Monday-Friday Saturday-Sunday- 9am-5pm   [place] - 5pm-9pm Monday-Friday Saturday-Sunday- 9am-5pm  [phone] - [place]   [phone] - [place]       What options do I have for visits at the clinic other than the traditional office visit?  To expand how we care for you, many of our providers are utilizing electronic visits (e-visits) and telephone visits, when medically  appropriate, for interactions with their patients rather than a visit in the clinic.   We also offer nurse visits for many medical concerns. Just like any other service, we will bill your insurance company for this type of visit based on time spent on the phone with your provider. Not all insurance companies cover these visits. Please check with your medical insurance if this type of visit is covered. You will be responsible for any charges that are not paid by your insurance.      E-visits via Voyager Therapeuticshart:  generally incur a $35.00 fee.  Telephone visits:  Time spent on the phone: *charged based on time that is spent on the phone in increments of 10 minutes. Estimated cost:   5-10 mins $30.00   11-20 mins. $59.00   21-30 mins. $85.00     Use OpenStudy (secure email communication and access to your chart) to send your primary care provider a message or make an appointment. Ask someone on your Team how to sign up for OpenStudy.  For a Price Quote for your services, please call our [employer] Line at [phone].      As always, Thank you for trusting us with your health care needs!    Sree Felder CMA            Follow-ups after your visit        Follow-up notes from your care team     Return in about 8 months (around 12/1/2018), or if symptoms worsen or fail to improve, for physical (fasting labs up to one week prior).      Who to contact     If you have questions or need follow up information about today's clinic visit or your schedule please contact [facility] directly at [phone].  Normal or non-critical lab and imaging results will be communicated to you by Voyager Therapeuticshart, letter or phone within 4 business days after the clinic has received the results. If you do not hear from us within 7 days, please contact the clinic through Voyager Therapeuticshart or phone. If you have a critical or abnormal lab result, we will notify you by phone as soon as possible.  Submit refill requests through OpenStudy or call your pharmacy  "and they will forward the refill request to us. Please allow 3 business days for your refill to be completed.          Additional Information About Your Visit        MyChart Information     [employer]ai lets you send messages to your doctor, view your test results, renew your prescriptions, schedule appointments and more. To sign up, go to [email or web address] . Click on \"Log in\" on the left side of the screen, which will take you to the Welcome page. Then click on \"Sign up Now\" on the right side of the page.     You will be asked to enter the access code listed below, as well as some personal information. Please follow the directions to create your username and password.     Your access code is: [email or web address]  Expires: 2018  2:27 PM     Your access code will  in 90 days. If you need help or a new code, please call your [place] clinic or [phone].        Care EveryWhere ID     This is your [facility] EveryWhere ID. This could be used by other organizations to access your [place] medical records  [id number]        Your Vitals Were     Pulse Temperature Respirations Height Last Period Pulse Oximetry    90 97.9  F (36.6  C) (Oral) 13 5' 6\" (1.676 m) 2010 96%    Breastfeeding? BMI (Body Mass Index)                No 33.38 kg/m2           Blood Pressure from Last 3 Encounters:   17 122/80   17 132/70   16 120/77    Weight from Last 3 Encounters:   18 206 lb 12.8 oz (93.8 kg)   17 203 lb (92.1 kg)   17 200 lb (90.7 kg)              We Performed the Following     Beta strep group A culture     Strep, Rapid Screen        Primary Care Provider Office Phone # Fax #    Danika Hammond [phone] [address] [place] [address]        Equal Access to Services     [facility] : Nery Garcia, jose watters, suhail [place] . So [facility] [phone].    ATENCIÓN: Si alisa jimenez, " tiene a hobbs disposición servicios gratuitos de asistencia lingüística. Lai cole [phone].    We comply with applicable federal civil rights laws and [place] laws. We do not discriminate on the basis of race, color, national origin, age, disability, sex, sexual orientation, or gender identity.            Thank you!     Thank you for choosing [facility]  for your care. Our goal is always to provide you with excellent care. Hearing back from our patients is one way we can continue to improve our services. Please take a few minutes to complete the written survey that you may receive in the mail after your visit with us. Thank you!             Your Updated Medication List - Protect others around you: Learn how to safely use, store and throw away your medicines at www.disposemymeds.org.          This list is accurate as of 3/26/18  2:27 PM.  Always use your most recent med list.                   Brand Name Dispense Instructions for use Diagnosis    BUSPAR 30 MG Tabs   Generic drug:  BusPIRone HCl      1 TABLET TWICE DAILY        MULTIVITAMIN TABS   OR      1 TABLET DAILY        PAXIL 40 MG tablet   Generic drug:  PARoxetine      1 1/2 TABLETS DAILY        simvastatin 20 MG tablet    ZOCOR    45 tablet    TAKE 1/2 TABLET BY MOUTH EVERY NIGHT AT BEDTIME    Hyperlipidemia LDL goal <160       WELLBUTRIN  MG 24 hr tablet   Generic drug:  buPROPion      1 TABLET DAILY

## 2018-03-26 NOTE — PATIENT INSTRUCTIONS
[facility]    If you have any questions regarding to your visit please contact your care team:       Team Red:   Clinic Hours Telephone Number   Dr. Kristi Villalta, NP   7am-7pm  Monday - Thursday   7am-5pm  Fridays  [phone]  (Appointment scheduling available 24/7)    Questions about your visit?   Team Line  [phone]   [facility] [place] [facility] - 11am-9pm Monday-Friday Saturday-Sunday- 9am-5pm   [place] - 5pm-9pm Monday-Friday Saturday-Sunday- 9am-5pm  [phone] - [place]   [phone] - [place]       What options do I have for visits at the clinic other than the traditional office visit?  To expand how we care for you, many of our providers are utilizing electronic visits (e-visits) and telephone visits, when medically appropriate, for interactions with their patients rather than a visit in the clinic.   We also offer nurse visits for many medical concerns. Just like any other service, we will bill your insurance company for this type of visit based on time spent on the phone with your provider. Not all insurance companies cover these visits. Please check with your medical insurance if this type of visit is covered. You will be responsible for any charges that are not paid by your insurance.      E-visits via [employer]:  generally incur a $35.00 fee.  Telephone visits:  Time spent on the phone: *charged based on time that is spent on the phone in increments of 10 minutes. Estimated cost:   5-10 mins $30.00   11-20 mins. $59.00   21-30 mins. $85.00     Use [employer] (secure email communication and access to your chart) to send your primary care provider a message or make an appointment. Ask someone on your Team how to sign up for [employer].  For a Price Quote for your services, please call our Consumer Price Line at [phone].      As always, Thank you for trusting us with your health care needs!    Viral  Upper Respiratory Illness (Adult)  You have a viral upper respiratory illness (URI), which is another term for the common cold. This illness is contagious during the first few days. It is spread through the air by coughing and sneezing. It may also be spread by direct contact (touching the sick person and then touching your own eyes, nose, or mouth). Frequent handwashing will decrease risk of spread. Most viral illnesses go away within 7 to 10 days with rest and simple home remedies. Sometimes the illness may last for several weeks. Antibiotics will not kill a virus, and they are generally not prescribed for this condition.    Home care    If symptoms are severe, rest at home for the first 2 to 3 days. When you resume activity, don't let yourself get too tired.    Avoid being exposed to cigarette smoke (yours or others ).    You may use acetaminophen or ibuprofen to control pain and fever, unless another medicine was prescribed. (Note: If you have chronic liver or kidney disease, have ever had a stomach ulcer or gastrointestinal bleeding, or are taking blood-thinning medicines, talk with your healthcare provider before using these medicines.) Aspirin should never be given to anyone under 18 years of age who is ill with a viral infection or fever. It may cause severe liver or brain damage.    Your appetite may be poor, so a light diet is fine. Avoid dehydration by drinking 6 to 8 glasses of fluids per day (water, soft drinks, juices, tea, or soup). Extra fluids will help loosen secretions in the nose and lungs.    Over-the-counter cold medicines will not shorten the length of time you re sick, but they may be helpful for the following symptoms: cough, sore throat, and nasal and sinus congestion. (Note: Do not use decongestants if you have high blood pressure.)  Follow-up care  Follow up with your healthcare provider, or as advised.  When to seek medical advice  Call your healthcare provider right away if any of these  occur:    Cough with lots of colored sputum (mucus)    Severe headache; face, neck, or ear pain    Difficulty swallowing due to throat pain    Fever of 100.4 F (38 C)  Call 911, or get immediate medical care  Call emergency services right away if any of these occur:    Chest pain, shortness of breath, wheezing, or difficulty breathing    Coughing up blood    Inability to swallow due to throat pain  Date Last Reviewed: 9/13/2015 2000-2017 [employer]. [address] [place] [address]. All rights reserved. This information is not intended as a substitute for professional medical care. Always follow your healthcare professional's instructions.    [facility]    If you have any questions regarding to your visit please contact your care team:       Team Red:   Clinic Hours Telephone Number   Dr. Kristi Villalta, NP   7am-7pm  Monday - Thursday   7am-5pm  Fridays  [phone]  (Appointment scheduling available 24/7)    Questions about your visit?   Team Line  [phone]   [facility] - [place] [facility] - 11am-9pm Monday-Friday Saturday-Sunday- 9am-5pm   [place] - 5pm-9pm Monday-Friday Saturday-Sunday- 9am-5pm  [phone] - [place]   [phone] - [place]       What options do I have for visits at the clinic other than the traditional office visit?  To expand how we care for you, many of our providers are utilizing electronic visits (e-visits) and telephone visits, when medically appropriate, for interactions with their patients rather than a visit in the clinic.   We also offer nurse visits for many medical concerns. Just like any other service, we will bill your insurance company for this type of visit based on time spent on the phone with your provider. Not all insurance companies cover these visits. Please check with your medical insurance if this type of visit is covered. You will be responsible  for any charges that are not paid by your insurance.      E-visits via Brightbox Chargehart:  generally incur a $35.00 fee.  Telephone visits:  Time spent on the phone: *charged based on time that is spent on the phone in increments of 10 minutes. Estimated cost:   5-10 mins $30.00   11-20 mins. $59.00   21-30 mins. $85.00     Use MoPixt (secure email communication and access to your chart) to send your primary care provider a message or make an appointment. Ask someone on your Team how to sign up for Within3.  For a Price Quote for your services, please call our Consumer Price Line at [phone].      As always, Thank you for trusting us with your health care needs!    Sree Felder CMA

## 2018-03-26 NOTE — PROGRESS NOTES
"  SUBJECTIVE:   Yessica Regalado is a 53 year old female who presents to clinic today for the following health issues:    RESPIRATORY SYMPTOMS    Duration: x 5 days    Description  nasal congestion, sore throat, facial pain/pressure, cough, fever, fatigue/malaise and myalgias    Severity: moderate    Accompanying signs and symptoms: None    History (predisposing factors):  none    Precipitating or alleviating factors: OTC- Cold Medication     Therapies tried and outcome:  rest and fluids, oral decongestant, Tea, Cough Drops    ROS:  CONSTITUTIONAL:POSITIVE  for chills and sweats  INTEGUMENTARY/SKIN: NEGATIVE for worrisome rashes, moles or lesions  EYES: NEGATIVE for vision changes or irritation  ENT/MOUTH: nasal congestion, rhinorrhea-clear and sore throat  RESP:cough-non productive  CV: NEGATIVE for chest pain/chest pressure    OBJECTIVE:     Pulse 90  Temp 97.9  F (36.6  C) (Oral)  Resp 13  Ht 5' 6\" (1.676 m)  Wt 206 lb 12.8 oz (93.8 kg)  LMP 12/20/2010  SpO2 96%  Breastfeeding? No  BMI 33.38 kg/m2  Body mass index is 33.38 kg/(m^2).  GENERAL: alert, no distress and obese  EYES: Eyes grossly normal to inspection, PERRL and conjunctivae and sclerae normal  HENT: ear canals and TM's normal, nose and mouth without ulcers or lesions  NECK: no adenopathy, no asymmetry, masses, or scars and thyroid normal to palpation  RESP: lungs clear to auscultation - no rales, rhonchi or wheezes  CV: regular rate and rhythm, normal S1 S2, no S3 or S4, no murmur, click or rub   MS: no gross musculoskeletal defects noted, no edema  PSYCH: mentation appears normal, affect normal/bright    Diagnostic Test Results:  Results for orders placed or performed in visit on 03/26/18   Strep, Rapid Screen   Result Value Ref Range    Specimen Description Throat     Rapid Strep A Screen       NEGATIVE: No Group A streptococcal antigen detected by immunoassay, await culture report.       ASSESSMENT/PLAN:   (J06.9) Upper respiratory tract " infection, unspecified type  (primary encounter (R07.0) Throat pain  Plan: Strep, Rapid Screen, Beta strep group A culture        Symptomatic care.  Return to clinic for persistence, recurrence or new symptoms.       See Patient Instructions    Kristi Jaramillo MD  [facility]

## 2018-03-26 NOTE — LETTER
[facility]  [address] [place] [address]    March 27, 2018    Yessica SCHNEIDER Sabine  [address] [place] [address]          Dear Yessica,  Your results are normal.   Enclosed is a copy of your results.     Results for orders placed or performed in visit on 03/26/18   Strep, Rapid Screen   Result Value Ref Range    Specimen Description Throat     Rapid Strep A Screen       NEGATIVE: No Group A streptococcal antigen detected by immunoassay, await culture report.   Beta strep group A culture   Result Value Ref Range    Specimen Description Throat     Culture Micro No beta hemolytic Streptococcus Group A isolated        If you have any questions or concerns, please call myself or my nurse at [phone].      Sincerely,        Kristi Jaramillo MD/pb

## 2018-03-26 NOTE — NURSING NOTE
"Chief Complaint   Patient presents with     URI       Initial Pulse 90  Temp 97.9  F (36.6  C) (Oral)  Resp 13  Ht 5' 6\" (1.676 m)  Wt 206 lb 12.8 oz (93.8 kg)  LMP 12/20/2010  SpO2 96%  Breastfeeding? No  BMI 33.38 kg/m2 Estimated body mass index is 33.38 kg/(m^2) as calculated from the following:    Height as of this encounter: 5' 6\" (1.676 m).    Weight as of this encounter: 206 lb 12.8 oz (93.8 kg).  Medication Reconciliation: complete     [place]      "

## 2018-03-27 LAB
BACTERIA SPEC CULT: NORMAL
SPECIMEN SOURCE: NORMAL

## 2018-11-16 ENCOUNTER — TELEPHONE (OUTPATIENT)
Dept: FAMILY MEDICINE | Facility: CLINIC | Age: 53
End: 2018-11-16

## 2018-11-16 NOTE — LETTER
November 16, 2018        Yessica Regalado,  [address] [place] [address]          Dear Yessica Regalado      Monitoring and managing your preventative and chronic health conditions are very important to us. Our records indicate that you have not scheduled for Mammogram, FIT test and Depression Check  which was recommended by Dr. Hammond. Please fill out the questionnaire and return back in envelope provided      If you have received your health care elsewhere, please call the clinic so the information can be documented in your chart.    Please call [phone] or message us through your Globial account to schedule an appointment or provide information for your chart.     Feel free to contact us if you have any questions or concerns!    I look forward to seeing you and working with you on your health care needs.     Sincerely,       Your [place] Care Team/HV

## 2018-11-16 NOTE — TELEPHONE ENCOUNTER
Panel Management Review      Patient has the following on her problem list:     Depression / Dysthymia review    Measure:  Needs PHQ-9 score of 4 or less during index window.  Administer PHQ-9 and if score is 5 or more, send encounter to provider for next steps.    5 - 7 month window range: FAILED    PHQ-9 SCORE 11/18/2016 5/2/2017 12/1/2017   Total Score - - -   Total Score 0 1 0       If PHQ-9 recheck is 5 or more, route to provider for next steps.    Patient is due for:  PHQ9 and DAP      Composite cancer screening  Chart review shows that this patient is due/due soon for the following Mammogram and Fecal Colorectal (FIT)  Summary:    Patient is due/failing the following:   DAP, FIT, MAMMOGRAM and PHQ9    Action needed:   Routed to provider for review. and Patient needs to do PHQ9.    Type of outreach:    Sent letter. and PHQ9 mailed out will reach out in 5 days    Questions for provider review:    Please do FIT Testing and MAMMO referral if appropriate                                                                                                                                    Velvet Cross CMA on 11/16/2018 at 1:44 PM       Chart routed to Provider .

## 2019-01-04 ENCOUNTER — TELEPHONE (OUTPATIENT)
Dept: FAMILY MEDICINE | Facility: CLINIC | Age: 54
End: 2019-01-04

## 2019-01-04 DIAGNOSIS — E78.5 HYPERLIPIDEMIA LDL GOAL <160: ICD-10-CM

## 2019-01-04 RX ORDER — SIMVASTATIN 20 MG
TABLET ORAL
Qty: 45 TABLET | Refills: 0 | OUTPATIENT
Start: 2019-01-04

## 2019-01-04 RX ORDER — SIMVASTATIN 20 MG
TABLET ORAL
Qty: 15 TABLET | Refills: 0 | Status: SHIPPED | OUTPATIENT
Start: 2019-01-04 | End: 2019-02-04

## 2019-01-04 NOTE — TELEPHONE ENCOUNTER
"Routing refill request to provider for review/approval because:  Labs not current:  LDL    Requested Prescriptions   Pending Prescriptions Disp Refills     simvastatin (ZOCOR) 20 MG tablet [Pharmacy Med Name: SIMVASTATIN 20MG TABLETS] 45 tablet 0     Sig: TAKE 1/2 TABLET BY MOUTH EVERY NIGHT AT BEDTIME    Statins Protocol Failed - 1/4/2019  8:35 AM       Failed - LDL on file in past 12 months    Recent Labs   Lab Test 12/01/17  0832   *            Passed - No abnormal creatine kinase in past 12 months    No lab results found.            Passed - Recent (12 mo) or future (30 days) visit within the authorizing provider's specialty    Patient had office visit in the last 12 months or has a visit in the next 30 days with authorizing provider or within the authorizing provider's specialty.  See \"Patient Info\" tab in inbasket, or \"Choose Columns\" in Meds & Orders section of the refill encounter.             Passed - Patient is age 18 or older       Passed - No active pregnancy on record       Passed - No positive pregnancy test in past 12 months        Kimber Roper RN  "

## 2019-02-04 DIAGNOSIS — E78.5 HYPERLIPIDEMIA LDL GOAL <160: ICD-10-CM

## 2019-02-04 RX ORDER — SIMVASTATIN 20 MG
TABLET ORAL
Qty: 15 TABLET | Refills: 0 | Status: SHIPPED | OUTPATIENT
Start: 2019-02-04 | End: 2019-03-29

## 2019-02-04 RX ORDER — SIMVASTATIN 20 MG
TABLET ORAL
Qty: 15 TABLET | Refills: 0 | Status: SHIPPED | OUTPATIENT
Start: 2019-02-04 | End: 2019-02-04

## 2019-02-04 NOTE — TELEPHONE ENCOUNTER
"Routing refill request to provider for review/approval because:  Labs not current:  LDL    Requested Prescriptions   Pending Prescriptions Disp Refills     simvastatin (ZOCOR) 20 MG tablet [Pharmacy Med Name: SIMVASTATIN 20MG TABLETS] 15 tablet 0     Sig: TAKE 1/2 TABLET BY MOUTH EVERY NIGHT AT BEDTIME    Statins Protocol Failed - 2/4/2019 10:11 AM       Failed - LDL on file in past 12 months    Recent Labs   Lab Test 12/01/17  0832   *            Passed - No abnormal creatine kinase in past 12 months    No lab results found.            Passed - Recent (12 mo) or future (30 days) visit within the authorizing provider's specialty    Patient had office visit in the last 12 months or has a visit in the next 30 days with authorizing provider or within the authorizing provider's specialty.  See \"Patient Info\" tab in inbasket, or \"Choose Columns\" in Meds & Orders section of the refill encounter.             Passed - Medication is active on med list       Passed - Patient is age 18 or older       Passed - No active pregnancy on record       Passed - No positive pregnancy test in past 12 months        Kimber Roper RN  "

## 2019-03-10 DIAGNOSIS — E78.5 HYPERLIPIDEMIA LDL GOAL <160: ICD-10-CM

## 2019-03-10 NOTE — LETTER
March 13, 2019    Yessica SCHNEIDER Sabine  [address] [facility] [address]      Dear Yessica,    We have been unsuccessful reaching you by telephone. You are due for an office visit with me for refills on SIMVASTATIN 20MG TABLETS. Please call to schedule to be seen prior to requesting more refills. You can schedule this one of a few ways. First using My Chart Scheduling, second is calling our Main number [phone] this line is open for scheduling 24 hours 7 days a week. Please let us know if you are getting care elsewhere.      Sincerely,        Danika Hammond MD/dt

## 2019-03-11 NOTE — TELEPHONE ENCOUNTER
Attempt 1, called patient and left VM to call clinic to schedule med check appointment with fasting labs. Please help schedule appointment if patient returns call.  Tamika DENISE CMA ([facility])

## 2019-03-12 NOTE — TELEPHONE ENCOUNTER
2nd attempt. called patient and left VM to call clinic to schedule med check appointment with fasting labs. Please help schedule appointment if patient returns call.  Velvet Cross CMA on 3/12/2019 at 9:50 AM

## 2019-03-13 RX ORDER — SIMVASTATIN 20 MG
TABLET ORAL
Qty: 15 TABLET | Refills: 0 | OUTPATIENT
Start: 2019-03-13

## 2019-03-13 NOTE — TELEPHONE ENCOUNTER
Attempt 3, called patient and left VM to call clinic to schedule med check appointment with fasting labs. Please address refill.  Tamika DENISE CMA ([facility])

## 2019-03-29 ENCOUNTER — ANCILLARY PROCEDURE (OUTPATIENT)
Dept: MAMMOGRAPHY | Facility: CLINIC | Age: 54
End: 2019-03-29
Payer: COMMERCIAL

## 2019-03-29 ENCOUNTER — OFFICE VISIT (OUTPATIENT)
Dept: FAMILY MEDICINE | Facility: CLINIC | Age: 54
End: 2019-03-29
Payer: COMMERCIAL

## 2019-03-29 VITALS
SYSTOLIC BLOOD PRESSURE: 122 MMHG | DIASTOLIC BLOOD PRESSURE: 84 MMHG | RESPIRATION RATE: 17 BRPM | TEMPERATURE: 97.6 F | WEIGHT: 208 LBS | OXYGEN SATURATION: 99 % | HEART RATE: 89 BPM | BODY MASS INDEX: 33.43 KG/M2 | HEIGHT: 66 IN

## 2019-03-29 DIAGNOSIS — E78.5 HYPERLIPIDEMIA LDL GOAL <160: ICD-10-CM

## 2019-03-29 DIAGNOSIS — Z11.4 SCREENING FOR HIV (HUMAN IMMUNODEFICIENCY VIRUS): ICD-10-CM

## 2019-03-29 DIAGNOSIS — Z12.31 VISIT FOR SCREENING MAMMOGRAM: ICD-10-CM

## 2019-03-29 DIAGNOSIS — F41.9 ANXIETY: Primary | ICD-10-CM

## 2019-03-29 DIAGNOSIS — F32.0 MILD MAJOR DEPRESSION (H): ICD-10-CM

## 2019-03-29 DIAGNOSIS — Z12.11 SCREEN FOR COLON CANCER: ICD-10-CM

## 2019-03-29 DIAGNOSIS — Z00.00 ROUTINE HISTORY AND PHYSICAL EXAMINATION OF ADULT: ICD-10-CM

## 2019-03-29 PROCEDURE — 77067 SCR MAMMO BI INCL CAD: CPT | Mod: TC

## 2019-03-29 PROCEDURE — 99396 PREV VISIT EST AGE 40-64: CPT | Performed by: FAMILY MEDICINE

## 2019-03-29 RX ORDER — SIMVASTATIN 20 MG
TABLET ORAL
Qty: 45 TABLET | Refills: 0 | Status: SHIPPED | OUTPATIENT
Start: 2019-03-29 | End: 2020-02-20

## 2019-03-29 ASSESSMENT — ANXIETY QUESTIONNAIRES
2. NOT BEING ABLE TO STOP OR CONTROL WORRYING: NOT AT ALL
6. BECOMING EASILY ANNOYED OR IRRITABLE: NOT AT ALL
IF YOU CHECKED OFF ANY PROBLEMS ON THIS QUESTIONNAIRE, HOW DIFFICULT HAVE THESE PROBLEMS MADE IT FOR YOU TO DO YOUR WORK, TAKE CARE OF THINGS AT HOME, OR GET ALONG WITH OTHER PEOPLE: NOT DIFFICULT AT ALL
5. BEING SO RESTLESS THAT IT IS HARD TO SIT STILL: NOT AT ALL
GAD7 TOTAL SCORE: 0
7. FEELING AFRAID AS IF SOMETHING AWFUL MIGHT HAPPEN: NOT AT ALL
1. FEELING NERVOUS, ANXIOUS, OR ON EDGE: NOT AT ALL
3. WORRYING TOO MUCH ABOUT DIFFERENT THINGS: NOT AT ALL

## 2019-03-29 ASSESSMENT — PATIENT HEALTH QUESTIONNAIRE - PHQ9
SUM OF ALL RESPONSES TO PHQ QUESTIONS 1-9: 0
5. POOR APPETITE OR OVEREATING: NOT AT ALL

## 2019-03-29 ASSESSMENT — MIFFLIN-ST. JEOR: SCORE: 1559.98

## 2019-03-29 NOTE — PROGRESS NOTES
SUBJECTIVE:   CC: eYssica Regalado is an 54 year old woman who presents for preventive health visit.     Healthy Habits:    Do you get at least three servings of calcium containing foods daily (dairy, green leafy vegetables, etc.)? yes    Amount of exercise or daily activities, outside of work: Minimal     Problems taking medications regularly No    Medication side effects: No    Have you had an eye exam in the past two years? yes    Do you see a dentist twice per year? yes    Do you have sleep apnea, excessive snoring or daytime drowsiness?no      Hyperlipidemia Follow-Up      Rate your low fat/cholesterol diet?: good    Taking statin?  Yes, no muscle aches from statin    Other lipid medications/supplements?:  none  Depression Followup    Status since last visit: Stable     See PHQ-9 for current symptoms.  Other associated symptoms: None    Complicating factors:   Significant life event:  No   Current substance abuse:  None  Anxiety or Panic symptoms:  No    PHQ 11/18/2016 5/2/2017 12/1/2017   PHQ-9 Total Score 0 1 0   Q9: Suicide Ideation Not at all Not at all Not at all   see PHQ9 and ALENA  In the past two weeks have you had thoughts of suicide or self-harm?  No.    Do you have concerns about your personal safety or the safety of others?   no  PHQ-9  English  PHQ-9   Any Language  Suicide Assessment Five-step Evaluation and Treatment (SAFE-T)    Amount of exercise or physical activity: None    Problems taking medications regularly: No    Medication side effects: none    Diet: low fat/cholesterol        Today's PHQ-2 Score:   PHQ-2 ( 1999 [employer]) 5/2/2017 10/7/2015   Q1: Little interest or pleasure in doing things 0 0   Q2: Feeling down, depressed or hopeless 0 0   PHQ-2 Score 0 0       Abuse: Current or Past(Physical, Sexual or Emotional)- No  Do you feel safe in your environment? Yes    Social History     Tobacco Use     Smoking status: Former Smoker     Packs/day: 1.00     Years: 14.00     Pack years: 14.00      Types: Cigarettes     Last attempt to quit: 2017     Years since quittin.2     Smokeless tobacco: Former User     Tobacco comment: 9/1/10   Substance Use Topics     Alcohol use: No     Alcohol/week: 0.0 oz     Comment: history of abuse      If you drink alcohol do you typically have >3 drinks per day or >7 drinks per week? No                     Reviewed orders with patient.  Reviewed health maintenance and updated orders accordingly - Yes  Labs reviewed in EPIC  BP Readings from Last 3 Encounters:   19 122/84   17 122/80   17 132/70    Wt Readings from Last 3 Encounters:   19 94.3 kg (208 lb)   18 93.8 kg (206 lb 12.8 oz)   17 92.1 kg (203 lb)                  Patient Active Problem List   Diagnosis     Hyperlipidemia LDL goal <160     Mild major depression (H)     Anxiety     Elevated blood pressure reading without diagnosis of hypertension     Past Surgical History:   Procedure Laterality Date     ARTHROSCOPY KNEE RT/LT      right     C NONSPECIFIC PROCEDURE      elbow fracture ORIF     C NONSPECIFIC PROCEDURE      cyst removal Left forearm     HYSTERECTOMY, PAP NO LONGER INDICATED      Menorrhagia     HYSTERECTOMY, VAGINAL      partial. no cancer.     TONSILLECTOMY & ADENOIDECTOMY         Social History     Tobacco Use     Smoking status: Former Smoker     Packs/day: 1.00     Years: 14.00     Pack years: 14.00     Types: Cigarettes     Last attempt to quit: 2017     Years since quittin.2     Smokeless tobacco: Former User     Tobacco comment: 9/1/10   Substance Use Topics     Alcohol use: No     Alcohol/week: 0.0 oz     Comment: history of abuse      Family History   Problem Relation Age of Onset     Hypertension Mother      Diabetes Mother      Depression Mother      Lipids Mother      Osteoporosis Mother      Hypertension Father      Diabetes Father      Lipids Father      Diabetes Maternal Grandmother      Cancer Paternal Grandfather          BONE     Allergies Son      Asthma Son      Allergies Daughter          Current Outpatient Medications   Medication Sig Dispense Refill     BUSPAR 30 MG OR TABS 1 TABLET TWICE DAILY       MULTIVITAMIN TABS   OR 1 TABLET DAILY       PAXIL 40 MG OR TABS  1 1/2 TABLETS DAILY       simvastatin (ZOCOR) 20 MG tablet TAKE 1/2 TABLET BY MOUTH EVERY NIGHT AT BEDTIME 45 tablet 0     WELLBUTRIN XL# 300 MG OR TB24 1 TABLET DAILY       No Known Allergies  Recent Labs   Lab Test 17  0832 16  0805 09/30/15  0717 14  0700 13  0702 12  0739   * 118* 134* 133* 116 147*   HDL 60 48* 49* 47* 44* 41*   TRIG 156* 175* 172* 149 136 160*   ALT  --  37 43 35 42 35   TSH  --   --   --   --  1.34 1.41      Advised mammogram    Pertinent mammograms are reviewed under the imaging tab.  History of abnormal Pap smear: Status post hysterectomy. Pap still indicated. no  PAP / HPV 1/3/2011 6/3/2009   PAP NIL NIL     Reviewed and updated as needed this visit by clinical staff  Tobacco  Allergies  Meds  Problems  Med Hx  Surg Hx  Fam Hx  Soc Hx          Reviewed and updated as needed this visit by Provider  Tobacco  Allergies  Meds  Problems  Med Hx  Surg Hx  Fam Hx  Soc Hx         Past Medical History:   Diagnosis Date     Alcoholic (H)     recovering sober since      Anxiety     sees NP psychiatry in [place].     Hyperlipidemia LDL goal <160      Moderate major depression (H)     sees NP psychiatry in [place].      (normal spontaneous vaginal delivery)       Past Surgical History:   Procedure Laterality Date     ARTHROSCOPY KNEE RT/LT      right     C NONSPECIFIC PROCEDURE      elbow fracture ORIF     C NONSPECIFIC PROCEDURE      cyst removal Left forearm     HYSTERECTOMY, PAP NO LONGER INDICATED      Menorrhagia     HYSTERECTOMY, VAGINAL      partial. no cancer.     TONSILLECTOMY & ADENOIDECTOMY         ROS:  CONSTITUTIONAL: NEGATIVE for fever, chills, change  "in weight  INTEGUMENTARY/SKIN: NEGATIVE for worrisome rashes, moles or lesions  EYES: NEGATIVE for vision changes or irritation  ENT: NEGATIVE for ear, mouth and throat problems  RESP: NEGATIVE for significant cough or SOB  BREAST: NEGATIVE for masses, tenderness or discharge  CV: NEGATIVE for chest pain, palpitations or peripheral edema  GI: NEGATIVE for nausea, abdominal pain, heartburn, or change in bowel habits  : NEGATIVE for unusual urinary or vaginal symptoms. No vaginal bleeding.  MUSCULOSKELETAL: NEGATIVE for significant arthralgias or myalgia  NEURO: NEGATIVE for weakness, dizziness or paresthesias  PSYCHIATRIC: NEGATIVE for changes in mood or affect     OBJECTIVE:   /84   Pulse 89   Temp 97.6  F (36.4  C) (Oral)   Resp 17   Ht 1.684 m (5' 6.3\")   Wt 94.3 kg (208 lb)   LMP 12/20/2010   SpO2 99%   BMI 33.27 kg/m    EXAM:  GENERAL APPEARANCE: healthy, alert and no distress  EYES: Eyes grossly normal to inspection, PERRL and conjunctivae and sclerae normal  HENT: ear canals and TM's normal, nose and mouth without ulcers or lesions, oropharynx clear and oral mucous membranes moist  NECK: no adenopathy, no asymmetry, masses, or scars and thyroid normal to palpation  RESP: lungs clear to auscultation - no rales, rhonchi or wheezes  BREAST: normal without masses, tenderness or nipple discharge and no palpable axillary masses or adenopathy  CV: regular rate and rhythm, normal S1 S2, no S3 or S4, no murmur, click or rub, no peripheral edema and peripheral pulses strong  ABDOMEN: soft, nontender, no hepatosplenomegaly, no masses and bowel sounds normal  MS: no musculoskeletal defects are noted and gait is age appropriate without ataxia  SKIN: no suspicious lesions or rashes  NEURO: Normal strength and tone, sensory exam grossly normal, mentation intact and speech normal  PSYCH: mentation appears normal and affect normal/bright    Diagnostic Test Results:  Pending     ASSESSMENT/PLAN:   1. Routine " "history and physical examination of adult      2. Hyperlipidemia LDL goal <160  Labs pending   - simvastatin (ZOCOR) 20 MG tablet; TAKE 1/2 TABLET BY MOUTH EVERY NIGHT AT BEDTIME  Dispense: 45 tablet; Refill: 0  - Lipid panel reflex to direct LDL Fasting; Future  - Glucose; Future    3. Screen for colon cancer  Advised cologuard    4. Visit for screening mammogram  Advised     5. Screening for HIV (human immunodeficiency virus)  Discussed   - HIV Antigen Antibody Combo; Future    6. Mild major depression (H)  Stable -sees psych    7. Anxiety  Sees Psych-see PHQ9 and ALENA in epic      COUNSELING:   Reviewed preventive health counseling, as reflected in patient instructions       Regular exercise       Healthy diet/nutrition       Vision screening       Hearing screening       Osteoporosis Prevention/Bone Health       HIV screeninx in teen years, 1x in adult years, and at intervals if high risk       Advance Care Planning    BP Readings from Last 1 Encounters:   19 122/84     Estimated body mass index is 33.27 kg/m  as calculated from the following:    Height as of this encounter: 1.684 m (5' 6.3\").    Weight as of this encounter: 94.3 kg (208 lb).      Weight management plan: Discussed healthy diet and exercise guidelines     reports that she quit smoking about 2 years ago. Her smoking use included cigarettes. She has a 14.00 pack-year smoking history. She has quit using smokeless tobacco.      Counseling Resources:  ATP IV Guidelines  Pooled Cohorts Equation Calculator  Breast Cancer Risk Calculator  FRAX Risk Assessment  ICSI Preventive Guidelines  Dietary Guidelines for Americans,   USDA's MyPlate  ASA Prophylaxis  Lung CA Screening    Danika Hammond MD  [facility]  "

## 2019-03-30 ASSESSMENT — ANXIETY QUESTIONNAIRES: GAD7 TOTAL SCORE: 0

## 2019-04-01 DIAGNOSIS — Z11.4 SCREENING FOR HIV (HUMAN IMMUNODEFICIENCY VIRUS): ICD-10-CM

## 2019-04-01 DIAGNOSIS — E78.5 HYPERLIPIDEMIA LDL GOAL <160: ICD-10-CM

## 2019-04-01 LAB
CHOLEST SERPL-MCNC: 198 MG/DL
GLUCOSE SERPL-MCNC: 106 MG/DL (ref 70–99)
HDLC SERPL-MCNC: 56 MG/DL
HIV 1+2 AB+HIV1 P24 AG SERPL QL IA: NONREACTIVE
LDLC SERPL CALC-MCNC: 120 MG/DL
NONHDLC SERPL-MCNC: 142 MG/DL
TRIGL SERPL-MCNC: 112 MG/DL

## 2019-04-01 PROCEDURE — 82947 ASSAY GLUCOSE BLOOD QUANT: CPT | Performed by: FAMILY MEDICINE

## 2019-04-01 PROCEDURE — 80061 LIPID PANEL: CPT | Performed by: FAMILY MEDICINE

## 2019-04-01 PROCEDURE — 36415 COLL VENOUS BLD VENIPUNCTURE: CPT | Performed by: FAMILY MEDICINE

## 2019-04-01 PROCEDURE — 87389 HIV-1 AG W/HIV-1&-2 AB AG IA: CPT | Performed by: FAMILY MEDICINE

## 2019-06-19 ENCOUNTER — OFFICE VISIT (OUTPATIENT)
Dept: FAMILY MEDICINE | Facility: CLINIC | Age: 54
End: 2019-06-19
Payer: COMMERCIAL

## 2019-06-19 VITALS
OXYGEN SATURATION: 97 % | RESPIRATION RATE: 18 BRPM | HEART RATE: 81 BPM | BODY MASS INDEX: 33.27 KG/M2 | WEIGHT: 207 LBS | TEMPERATURE: 97.3 F | HEIGHT: 66 IN | SYSTOLIC BLOOD PRESSURE: 122 MMHG | DIASTOLIC BLOOD PRESSURE: 70 MMHG

## 2019-06-19 DIAGNOSIS — J20.9 ACUTE BRONCHITIS WITH SYMPTOMS > 10 DAYS: Primary | ICD-10-CM

## 2019-06-19 PROCEDURE — 99213 OFFICE O/P EST LOW 20 MIN: CPT | Performed by: PHYSICIAN ASSISTANT

## 2019-06-19 RX ORDER — DEXTROMETHORPHAN POLISTIREX 30 MG/5ML
60 SUSPENSION ORAL 2 TIMES DAILY
Qty: 148 ML | Refills: 0 | Status: SHIPPED | OUTPATIENT
Start: 2019-06-19 | End: 2020-02-20

## 2019-06-19 RX ORDER — AZITHROMYCIN 500 MG/1
500 TABLET, FILM COATED ORAL DAILY
Qty: 3 TABLET | Refills: 0 | Status: SHIPPED | OUTPATIENT
Start: 2019-06-19 | End: 2019-06-22

## 2019-06-19 ASSESSMENT — MIFFLIN-ST. JEOR: SCORE: 1558.57

## 2019-06-19 NOTE — PROGRESS NOTES
"Subjective     Yessica Regalado is a 54 year old female who presents to clinic today for the following health issues:    HPI   ENT Symptoms             Symptoms: cc Present Absent Comment   Fever/Chills   x    Fatigue  x     Muscle Aches   x    Eye Irritation   x    Sneezing   x Presented at onset of symptoms   Nasal Juice/Drg  x     Sinus Pressure/Pain   x    Loss of smell   x    Dental pain   x    Sore Throat   x Sore throat only when coughing   Swollen Glands   x    Ear Pain/Fullness   x    Cough  x     Wheeze   x    Chest Pain   x    Shortness of breath   x    Rash   x    Other   x      Symptom duration:  2 weeks   Symptom severity:  moderate   Treatments tried:  Allegra OTC   Contacts:  none         Review of Systems   ROS COMP: Constitutional, HEENT, cardiovascular, pulmonary, gi and gu systems are negative, except as otherwise noted.      Objective    /70   Pulse 81   Temp 97.3  F (36.3  C) (Oral)   Resp 18   Ht 1.689 m (5' 6.5\")   Wt 93.9 kg (207 lb)   LMP 12/20/2010   SpO2 97%   BMI 32.91 kg/m    Body mass index is 32.91 kg/m .  Physical Exam   GENERAL: healthy, alert and no distress  HENT: normal cephalic/atraumatic, ear canals and TM's normal, nasal mucosa edematous , oropharynx clear and oral mucous membranes moist  NECK: no adenopathy, no asymmetry, masses, or scars and thyroid normal to palpation  RESP: lungs clear to auscultation but harsh - no rales, rhonchi or wheezes    Diagnostic Test Results:  none         Assessment & Plan     1. Acute bronchitis with symptoms > 10 days  - azithromycin (ZITHROMAX) 500 MG tablet; Take 1 tablet (500 mg) by mouth daily for 3 days  Dispense: 3 tablet; Refill: 0  - dextromethorphan (DELSYM) 30 MG/5ML liquid; Take 10 mLs (60 mg) by mouth 2 times daily  Dispense: 148 mL; Refill: 0    Use medication as directed.  Follow up if symptoms should persist, change or worsen.  Patient amenable to this follow up plan.         No follow-ups on file.    Michel Hernández" MATTHIAS Canas  [facility]

## 2019-06-25 DIAGNOSIS — E78.5 HYPERLIPIDEMIA LDL GOAL <160: ICD-10-CM

## 2019-06-25 RX ORDER — SIMVASTATIN 20 MG
TABLET ORAL
Qty: 90 TABLET | Refills: 0 | Status: SHIPPED | OUTPATIENT
Start: 2019-06-25 | End: 2020-01-29

## 2019-07-01 ENCOUNTER — TRANSFERRED RECORDS (OUTPATIENT)
Dept: HEALTH INFORMATION MANAGEMENT | Facility: CLINIC | Age: 54
End: 2019-07-01
Payer: COMMERCIAL

## 2019-07-01 LAB — COLOGUARD INSUFFICIENT SPECIMEN: NORMAL

## 2019-07-09 ENCOUNTER — TELEPHONE (OUTPATIENT)
Dept: FAMILY MEDICINE | Facility: CLINIC | Age: 54
End: 2019-07-09

## 2019-07-09 NOTE — TELEPHONE ENCOUNTER
Received a fax from [employer]      The sample could not be processed.     The patient will be contacted to initiate a new sample collection.     Order number: [id number]    Phone number [phone]  Fax number [phone]    Fax was given to the provider.     Chelsi Lake,

## 2019-07-24 DIAGNOSIS — E78.5 HYPERLIPIDEMIA LDL GOAL <160: ICD-10-CM

## 2019-07-25 RX ORDER — SIMVASTATIN 20 MG
TABLET ORAL
Qty: 45 TABLET | Refills: 2 | Status: SHIPPED | OUTPATIENT
Start: 2019-07-25 | End: 2020-02-20

## 2019-07-25 NOTE — TELEPHONE ENCOUNTER
Prescription approved per [facility] Refill Protocol.  Monique Fierro RN

## 2020-01-28 DIAGNOSIS — E78.5 HYPERLIPIDEMIA LDL GOAL <160: ICD-10-CM

## 2020-01-29 RX ORDER — SIMVASTATIN 20 MG
TABLET ORAL
Qty: 45 TABLET | Refills: 0 | Status: SHIPPED | OUTPATIENT
Start: 2020-01-29 | End: 2020-04-26

## 2020-02-08 ENCOUNTER — TELEPHONE (OUTPATIENT)
Dept: FAMILY MEDICINE | Facility: CLINIC | Age: 55
End: 2020-02-08

## 2020-02-08 DIAGNOSIS — Z12.11 SPECIAL SCREENING FOR MALIGNANT NEOPLASMS, COLON: Primary | ICD-10-CM

## 2020-02-08 NOTE — TELEPHONE ENCOUNTER
2/8/2020    Call Regarding Preventive Health Screening Colonoscopy       Attempt 1    Message left with patient    Comments: Fit Test ordered for [place] Location      Outreach   CS

## 2020-02-20 ENCOUNTER — OFFICE VISIT (OUTPATIENT)
Dept: FAMILY MEDICINE | Facility: CLINIC | Age: 55
End: 2020-02-20
Payer: COMMERCIAL

## 2020-02-20 VITALS
WEIGHT: 211 LBS | HEIGHT: 67 IN | SYSTOLIC BLOOD PRESSURE: 130 MMHG | HEART RATE: 77 BPM | OXYGEN SATURATION: 98 % | TEMPERATURE: 97.6 F | DIASTOLIC BLOOD PRESSURE: 80 MMHG | RESPIRATION RATE: 18 BRPM | BODY MASS INDEX: 33.12 KG/M2

## 2020-02-20 DIAGNOSIS — B35.4 TINEA CORPORIS: Primary | ICD-10-CM

## 2020-02-20 PROCEDURE — 99213 OFFICE O/P EST LOW 20 MIN: CPT | Performed by: PHYSICIAN ASSISTANT

## 2020-02-20 RX ORDER — KETOCONAZOLE 20 MG/G
CREAM TOPICAL DAILY
Qty: 15 G | Refills: 1 | Status: SHIPPED | OUTPATIENT
Start: 2020-02-20 | End: 2020-03-12 | Stop reason: ALTCHOICE

## 2020-02-20 ASSESSMENT — MIFFLIN-ST. JEOR: SCORE: 1572.33

## 2020-02-20 ASSESSMENT — PATIENT HEALTH QUESTIONNAIRE - PHQ9: SUM OF ALL RESPONSES TO PHQ QUESTIONS 1-9: 0

## 2020-02-20 NOTE — PROGRESS NOTES
"Subjective     Yessica Regalado is a 55 year old female who presents to clinic today for the following health issues:    HPI   Rash      Duration: 2  months    Description  Location: hands  Itching: mild    Intensity:  moderate    Accompanying signs and symptoms: redness    History (similar episodes/previous evaluation): None    Precipitating or alleviating factors:  New exposures:  None  Recent travel: no      Therapies tried and outcome: hydrocortisone cream -  usually effective but not working      Review of Systems   ROS COMP: Constitutional, HEENT, cardiovascular, pulmonary, gi and gu systems are negative, except as otherwise noted.      Objective    /80   Pulse 77   Temp 97.6  F (36.4  C) (Oral)   Resp 18   Ht 1.69 m (5' 6.54\")   Wt 95.7 kg (211 lb)   LMP 12/20/2010   SpO2 98%   BMI 33.51 kg/m    Body mass index is 33.51 kg/m .  Physical Exam   GENERAL: healthy, alert and no distress  SKIN: round erythematous patches with central clearing and sl rolled border with scale c/w tinea.  Cannot r/o nummular eczema.     Diagnostic Test Results:  none         Assessment & Plan     1. Tinea corporis  - ketoconazole 2 % EX external cream; Apply topically daily  Dispense: 15 g; Refill: 1       Return if symptoms worsen or fail to improve.    Michel Canas PA-C  [facility]      "

## 2020-03-10 ENCOUNTER — TELEPHONE (OUTPATIENT)
Dept: FAMILY MEDICINE | Facility: CLINIC | Age: 55
End: 2020-03-10

## 2020-03-10 NOTE — TELEPHONE ENCOUNTER
Reason for call:  Symptom   Symptom or request:  Patient was seen for ringworm 3 weeks ago. She still has it. Please call and advise.     Duration (how long have symptoms been present):  3 weeks     Have you been treated for this before? Yes    Additional comments:  Na     Phone number to reach patient:  Work number on file:  [phone] (work)    Best Time:   Any     Can we leave a detailed message on this number?  YES    Travel screening: Not Applicable

## 2020-03-10 NOTE — TELEPHONE ENCOUNTER
Patient was seen on 2/20/20 for Tinea corporis and was given Ketoconazole cream.  The rash has not improved at all some of the 4 spots are a little bigger. The rash has not spread.  The spots are circular, red, slightly bumpy and itchy some times usually after applying cream.  What are the next steps?  Reta Ashton RN

## 2020-03-11 NOTE — TELEPHONE ENCOUNTER
RTC to evaluate as they have changed appearance and diagnosis may need revision.  Alexandre RIZZO

## 2020-03-12 ENCOUNTER — OFFICE VISIT (OUTPATIENT)
Dept: FAMILY MEDICINE | Facility: CLINIC | Age: 55
End: 2020-03-12
Payer: COMMERCIAL

## 2020-03-12 VITALS
HEART RATE: 78 BPM | DIASTOLIC BLOOD PRESSURE: 70 MMHG | WEIGHT: 213.8 LBS | TEMPERATURE: 97 F | HEIGHT: 67 IN | BODY MASS INDEX: 33.56 KG/M2 | SYSTOLIC BLOOD PRESSURE: 122 MMHG | OXYGEN SATURATION: 97 % | RESPIRATION RATE: 18 BRPM

## 2020-03-12 DIAGNOSIS — L30.0 NUMMULAR ECZEMA: Primary | ICD-10-CM

## 2020-03-12 PROCEDURE — 99213 OFFICE O/P EST LOW 20 MIN: CPT | Performed by: PHYSICIAN ASSISTANT

## 2020-03-12 RX ORDER — TRIAMCINOLONE ACETONIDE 5 MG/G
1 OINTMENT TOPICAL 2 TIMES DAILY
Qty: 15 G | Refills: 1 | Status: SHIPPED | OUTPATIENT
Start: 2020-03-12 | End: 2024-01-17

## 2020-03-12 ASSESSMENT — MIFFLIN-ST. JEOR: SCORE: 1585.03

## 2020-03-12 NOTE — LETTER
My Depression Action Plan  Name: Yessica Regalado   Date of Birth 1965  Date: 3/12/2020    My doctor: Danika Hammond   [facility] [id number] [phone]          GREEN    ZONE   Good Control    What it looks like:     Things are going generally well. You have normal ups and downs. You may even feel depressed from time to time, but bad moods usually last less than a day.   What you need to do:  1. Continue to care for yourself (see self care plan)  2. Check your depression survival kit and update it as needed  3. Follow your physician s recommendations including any medication.  4. Do not stop taking medication unless you consult with your physician first.           YELLOW         ZONE Getting Worse    What it looks like:     Depression is starting to interfere with your life.     It may be hard to get out of bed; you may be starting to isolate yourself from others.    Symptoms of depression are starting to last most all day and this has happened for several days.     You may have suicidal thoughts but they are not constant.   What you need to do:     1. Call your care team. Your response to treatment will improve if you keep your care team informed of your progress. Yellow periods are signs an adjustment may need to be made.     2. Continue your self-care.  Just get dressed and ready for the day.  Don't give yourself time to talk yourself out of it.    3. Talk to someone in your support network.    4. Open up your Depression Self-Care Plan/Wellness Kit.           RED    ZONE Medical Alert - Get Help    What it looks like:     Depression is seriously interfering with your life.     You may experience these or other symptoms: You can t get out of bed most days, can t work or engage in other necessary activities, you have trouble taking care of basic hygiene, or basic responsibilities, thoughts of suicide or death that will not go away,  self-injurious behavior.     What you need to do:  1. Call your care team and request a same-day appointment. If they are not available (weekends or after hours) call your local crisis line, emergency room or 911.            Depression Self-Care Plan / Wellness Kit    Self-Care for Depression  Here s the deal. Your body and mind are really not as separate as most people think.  What you do and think affects how you feel and how you feel influences what you do and think. This means if you do things that people who feel good do, it will help you feel better.  Sometimes this is all it takes.  There is also a place for medication and therapy depending on how severe your depression is, so be sure to consult with your medical provider and/ or Behavioral Health Consultant if your symptoms are worsening or not improving.     In order to better manage my stress, I will:    Exercise  Get some form of exercise, every day. This will help reduce pain and release endorphins, the  feel good  chemicals in your brain. This is almost as good as taking antidepressants!  This is not the same as joining a gym and then never going! (they count on that by the way ) It can be as simple as just going for a walk or doing some gardening, anything that will get you moving.      Hygiene   Maintain good hygiene (get out of bed in the morning, make your bed, brush your teeth, take a shower, and get dressed like you were going to work, even if you are unemployed).  If your clothes don't fit try to get ones that do.    Diet  Strive to eat foods that are good for me, drink plenty of water, and avoid excessive sugar, caffeine, alcohol, and other mood-altering substances.  Some foods that are helpful in depression are: complex carbohydrates, B vitamins, flaxseed, fish or fish oil, fresh fruits and vegetables.    Psychotherapy  Agree to participate in Individual Therapy (if recommended).    Medication  If prescribed medications, I agree to take them.   Missing doses can result in serious side effects.  I understand that drinking alcohol, or other illicit drug use, may cause potential side effects.  I will not stop my medication abruptly without first discussing it with my provider.    Staying Connected With Others  Stay in touch with my friends, family members, and my primary care provider/team.    Use your imagination  Be creative.  We all have a creative side; it doesn t matter if it s oil painting, sand castles, or mud pies! This will also kick up the endorphins.    Witness Beauty  (AKA stop and smell the roses) Take a look outside, even in mid-winter. Notice colors, textures. Watch the squirrels and birds.     Service to others  Be of service to others.  There is always someone else in need.  By helping others we can  get out of ourselves  and remember the really important things.  This also provides opportunities for practicing all the other parts of the program.    Humor  Laugh and be silly!  Adjust your TV habits for less news and crime-drama and more comedy.    Control your stress  Try breathing deep, massage therapy, biofeedback, and meditation. Find time to relax each day.     Crisis Text Line  http://www.crisistextline.org    The Crisis Text Line serves anyone, in any type of crisis, providing access to free, 24/7 support and information via the medium people already use and trust:    Here's how it works:  1.  Text 340-641 from anywhere in the [place], anytime, about any type of crisis.  2.  A live, trained Crisis Counselor receives the text and responds quickly.  3.  The volunteer Crisis Counselor will help you move from a 'hot moment to a cool moment'.    My support system    Clinic Contact:  Phone number:    Contact 1:  Phone number:    Contact 2:  Phone number:    Oriental orthodox/:  Phone number:    Therapist:  Phone number:    Local crisis center:    Phone number:    Other community support:  Phone number:

## 2020-03-12 NOTE — PATIENT INSTRUCTIONS
Patient Education     Atopic Dermatitis (Adult)  Atopic dermatitis is a dry, itchy, red rash. It s also called eczema. The rash is chronic, or ongoing. It can come and go over time. The disease is often passed down in families. It causes a problem with the skin barrier that makes the skin more sensitive to the environment and other factors. The increased skin sensitivity causes an itch, which causes scratching. Scratching can worsen the itching or also break the skin. This can put the skin at risk of infection.  The condition is most common in people with asthma, hay fever, hives, or dry or sensitive skin. The rash may be caused by extreme heat or heavy sweating. Skin irritants can cause the rash to flare up. These can include wool or silk clothing, grease, oils, some medicines, and harsh soaps and detergents. Emotional stress can also be a trigger.  Treatment is done to relieve the itching and inflammation of the skin.  Home care  Follow these tips to care for your condition:    Keep the areas of rash clean by bathing at least every other day. Use lukewarm water to bathe. Don t use hot water, which can dry out the skin.    Don t use soaps with strong detergents. Use mild soaps made for sensitive skin.    Apply a cream or ointment to damp skin right after bathing.    Avoid things that irritate your skin. Wear absorbent, soft fabrics next to the skin rather than rough or scratchy materials.    Use mild laundry soap free of scents and perfumes. Make sure to rinse all the soap out of your clothes.    Treat any skin infection as directed.    Use oral diphenhydramine to help reduce itching. This is an antihistamine you can buy at drug and grocery stores. It can make you sleepy, so use lower doses during the daytime. Or you can use loratadine. This is an antihistamine that will not make you sleepy. Do not use diphenhydramine if you have glaucoma or have trouble urinating due to an enlarged prostate.  Follow-up care  See  your healthcare provider, or as advised. If your symptoms don t get better or if they get worse in the next 7 days, make an appointment with your healthcare provider.  When to seek medical advice  Call your healthcare provider right away  if any of these occur:    Increasing area of redness or pain in the skin    Yellow crusts or wet drainage from the rash    Fever of 100.4 F (38 C) or higher, or as directed by your healthcare provider  Date Last Reviewed: 9/1/2016 2000-2019 [employer]. [address] [place], [place] [address]. All rights reserved. This information is not intended as a substitute for professional medical care. Always follow your healthcare professional's instructions.

## 2020-03-12 NOTE — PROGRESS NOTES
"Subjective     Yessica Regalado is a 55 year old female who presents to clinic today for the following health issues:    HPI   Patient presents with:  RECHECK: Rash. ketoconazole 2 % EX external cream is not helping with symptoms   Health Maintenance: DUE: PHQ9,MAMMO SCREENING, PREVENTIVE CARE VISIT, COLORECTAL CANCER SCREENING, ACP    PHQ-9 and ALENA-7 obtained and reviewed.     Patient notes no improvement on antifungal cream.  Sx are not progressing.  Skin care reviewed.     Review of Systems   ROS COMP: Constitutional, HEENT, cardiovascular, pulmonary, gi and gu systems are negative, except as otherwise noted.      Objective    /70   Pulse 78   Temp 97  F (36.1  C) (Oral)   Resp 18   Ht 1.69 m (5' 6.54\")   Wt 97 kg (213 lb 12.8 oz)   LMP 12/20/2010   SpO2 97%   BMI 33.96 kg/m    Body mass index is 33.96 kg/m .  Physical Exam   GENERAL: healthy, alert and no distress  SKIN: round erythematous patches on bilat upper extremeties 3-4 cm.      Diagnostic Test Results:  none         Assessment & Plan     1. Nummular eczema  - triamcinolone (KENALOG) 0.5 % external ointment; Apply 1 g topically 2 times daily  Dispense: 15 g; Refill: 1     BMI:   Estimated body mass index is 33.96 kg/m  as calculated from the following:    Height as of this encounter: 1.69 m (5' 6.54\").    Weight as of this encounter: 97 kg (213 lb 12.8 oz).   Weight management plan: Discussed healthy diet and exercise guidelines    Use medication as directed.     Return if symptoms worsen or fail to improve.    Michel Canas PA-C  [facility]      "

## 2020-04-26 DIAGNOSIS — E78.5 HYPERLIPIDEMIA LDL GOAL <160: ICD-10-CM

## 2020-04-26 RX ORDER — SIMVASTATIN 20 MG
TABLET ORAL
Qty: 45 TABLET | Refills: 0 | Status: SHIPPED | OUTPATIENT
Start: 2020-04-26 | End: 2020-07-25

## 2020-04-26 NOTE — TELEPHONE ENCOUNTER
Medication is being filled for 1 time refill only due to:  Patient needs to be seen because it has been more than one year since last visit. Needs fasting liids also.  Please schedule.

## 2020-07-24 DIAGNOSIS — E78.5 HYPERLIPIDEMIA LDL GOAL <160: ICD-10-CM

## 2020-07-24 NOTE — LETTER
July 27, 2020          Yessica Regalado,  [address] [facility] [address]          Dear Yessica Regalado      Your provider has sent a  sly refill of simvastatin (ZOCOR) 20 MG tablet. You are due for an appointment for further refills. We are currently only able to see select in person visits. We ask that you schedule a telephone visit, video visit or evisit (through Noteleaf) with your provider.  Please contact the clinic to schedule an appointment for further refills.     Sincerely,       Your [facility]/

## 2020-07-25 DIAGNOSIS — E78.5 HYPERLIPIDEMIA LDL GOAL <160: ICD-10-CM

## 2020-07-25 RX ORDER — SIMVASTATIN 20 MG
TABLET ORAL
Qty: 15 TABLET | Refills: 0 | Status: SHIPPED | OUTPATIENT
Start: 2020-07-25 | End: 2020-07-27

## 2020-07-25 NOTE — TELEPHONE ENCOUNTER
Medication is being filled for 1 time refill only due to:  Patient needs to be seen because need annual exam.   Please CALL to schedule.  Monique Fierro RN

## 2020-07-25 NOTE — LETTER
July 27, 2020          Yessica Regalado,  [address] [facility] [address]          Dear Yessica Regalado      Your provider has sent a  sly refill of simvastatin (ZOCOR) 20 MG tablet . You are due for an appointment for further refills. We are currently only able to see select in person visits. We ask that you schedule a telephone visit, video visit or evisit (through [email or web address]) with your provider.  Please contact the clinic to schedule an appointment for further refills.       Sincerely,         Your [facility]/

## 2020-07-26 NOTE — TELEPHONE ENCOUNTER
Routing refill request to provider for review/approval because: Requesting 90 days  Labs not current:    Lab Results   Component Value Date    CHOL 198 04/01/2019     Lab Results   Component Value Date    HDL 56 04/01/2019     Lab Results   Component Value Date     04/01/2019     Lab Results   Component Value Date    TRIG 112 04/01/2019     Lab Results   Component Value Date    CHOLHDLRATIO 4.4 09/30/2015

## 2020-07-27 RX ORDER — SIMVASTATIN 20 MG
TABLET ORAL
Qty: 45 TABLET | Refills: 0 | Status: SHIPPED | OUTPATIENT
Start: 2020-07-27 | End: 2020-09-08
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
## 2020-09-08 ENCOUNTER — VIRTUAL VISIT (OUTPATIENT)
Dept: FAMILY MEDICINE | Facility: CLINIC | Age: 55
End: 2020-09-08
Payer: COMMERCIAL

## 2020-09-08 DIAGNOSIS — Z12.31 VISIT FOR SCREENING MAMMOGRAM: Primary | ICD-10-CM

## 2020-09-08 DIAGNOSIS — F10.21 ALCOHOL DEPENDENCE IN REMISSION (H): ICD-10-CM

## 2020-09-08 DIAGNOSIS — E78.5 HYPERLIPIDEMIA LDL GOAL <160: ICD-10-CM

## 2020-09-08 DIAGNOSIS — F32.5 MAJOR DEPRESSION IN REMISSION (H): ICD-10-CM

## 2020-09-08 DIAGNOSIS — F41.9 ANXIETY: ICD-10-CM

## 2020-09-08 PROCEDURE — 99213 OFFICE O/P EST LOW 20 MIN: CPT | Mod: GT | Performed by: FAMILY MEDICINE

## 2020-09-08 RX ORDER — SIMVASTATIN 20 MG
20 TABLET ORAL AT BEDTIME
Qty: 45 TABLET | Refills: 0 | Status: SHIPPED | OUTPATIENT
Start: 2020-09-08 | End: 2020-09-10

## 2020-09-08 ASSESSMENT — ANXIETY QUESTIONNAIRES
5. BEING SO RESTLESS THAT IT IS HARD TO SIT STILL: NOT AT ALL
GAD7 TOTAL SCORE: 1
IF YOU CHECKED OFF ANY PROBLEMS ON THIS QUESTIONNAIRE, HOW DIFFICULT HAVE THESE PROBLEMS MADE IT FOR YOU TO DO YOUR WORK, TAKE CARE OF THINGS AT HOME, OR GET ALONG WITH OTHER PEOPLE: NOT DIFFICULT AT ALL
6. BECOMING EASILY ANNOYED OR IRRITABLE: SEVERAL DAYS
1. FEELING NERVOUS, ANXIOUS, OR ON EDGE: NOT AT ALL
2. NOT BEING ABLE TO STOP OR CONTROL WORRYING: NOT AT ALL
3. WORRYING TOO MUCH ABOUT DIFFERENT THINGS: NOT AT ALL
7. FEELING AFRAID AS IF SOMETHING AWFUL MIGHT HAPPEN: NOT AT ALL

## 2020-09-08 ASSESSMENT — PATIENT HEALTH QUESTIONNAIRE - PHQ9
SUM OF ALL RESPONSES TO PHQ QUESTIONS 1-9: 0
5. POOR APPETITE OR OVEREATING: NOT AT ALL

## 2020-09-08 NOTE — TELEPHONE ENCOUNTER
Duplicate Refill Request    Outpatient Medication Detail      Disp  Refills  Start  End  SUMMER    simvastatin (ZOCOR) 20 MG tablet  45 tablet  0  9/8/2020   No    Sig - Route: Take 1 tablet (20 mg) by mouth At Bedtime - Oral    Sent to pharmacy as: Simvastatin 20 MG Oral Tablet (ZOCOR)    Class: E-Prescribe    Order: [id number]    E-Prescribing Status: Receipt confirmed by pharmacy (9/8/2020 11:27 AM CDT)    Printout Tracking     External Result Report    Pharmacy     [id number] - [place], [place] - [address] AT [facility] [address]    Associated Diagnoses     Hyperlipidemia LDL goal <160 [E78.5]         Bella Cross, CMA

## 2020-09-09 ASSESSMENT — ANXIETY QUESTIONNAIRES: GAD7 TOTAL SCORE: 1

## 2020-09-10 RX ORDER — SIMVASTATIN 20 MG
TABLET ORAL
Qty: 90 TABLET | Refills: 0 | Status: SHIPPED | OUTPATIENT
Start: 2020-09-10 | End: 2020-11-20

## 2020-10-21 ENCOUNTER — ANCILLARY PROCEDURE (OUTPATIENT)
Dept: MAMMOGRAPHY | Facility: CLINIC | Age: 55
End: 2020-10-21
Attending: FAMILY MEDICINE
Payer: COMMERCIAL

## 2020-10-21 DIAGNOSIS — E78.5 HYPERLIPIDEMIA LDL GOAL <160: ICD-10-CM

## 2020-10-21 DIAGNOSIS — Z12.31 VISIT FOR SCREENING MAMMOGRAM: ICD-10-CM

## 2020-10-21 LAB
CHOLEST SERPL-MCNC: 195 MG/DL
GLUCOSE SERPL-MCNC: 101 MG/DL (ref 70–99)
HDLC SERPL-MCNC: 56 MG/DL
LDLC SERPL CALC-MCNC: 111 MG/DL
NONHDLC SERPL-MCNC: 139 MG/DL
TRIGL SERPL-MCNC: 138 MG/DL

## 2020-10-21 PROCEDURE — 82947 ASSAY GLUCOSE BLOOD QUANT: CPT | Performed by: PHYSICIAN ASSISTANT

## 2020-10-21 PROCEDURE — 80061 LIPID PANEL: CPT | Performed by: PHYSICIAN ASSISTANT

## 2020-10-21 PROCEDURE — 77067 SCR MAMMO BI INCL CAD: CPT | Performed by: RADIOLOGY

## 2020-10-26 ASSESSMENT — ENCOUNTER SYMPTOMS
DIARRHEA: 0
WEAKNESS: 0
FREQUENCY: 0
CONSTIPATION: 0
FEVER: 0
JOINT SWELLING: 0
DIZZINESS: 0
PALPITATIONS: 0
ABDOMINAL PAIN: 0
HEMATURIA: 0
COUGH: 0
BREAST MASS: 0
DYSURIA: 0
HEADACHES: 0
SORE THROAT: 0
ARTHRALGIAS: 0
NAUSEA: 0
HEARTBURN: 0
MYALGIAS: 0
PARESTHESIAS: 0
EYE PAIN: 0
SHORTNESS OF BREATH: 0
NERVOUS/ANXIOUS: 0
HEMATOCHEZIA: 0
CHILLS: 0
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
## 2020-10-28 ENCOUNTER — OFFICE VISIT (OUTPATIENT)
Dept: FAMILY MEDICINE | Facility: CLINIC | Age: 55
End: 2020-10-28
Payer: COMMERCIAL

## 2020-10-28 VITALS
RESPIRATION RATE: 16 BRPM | DIASTOLIC BLOOD PRESSURE: 78 MMHG | WEIGHT: 216.6 LBS | HEART RATE: 81 BPM | BODY MASS INDEX: 34.4 KG/M2 | TEMPERATURE: 98.1 F | OXYGEN SATURATION: 98 % | SYSTOLIC BLOOD PRESSURE: 124 MMHG

## 2020-10-28 DIAGNOSIS — Z12.11 SCREEN FOR COLON CANCER: ICD-10-CM

## 2020-10-28 DIAGNOSIS — Z00.00 ROUTINE GENERAL MEDICAL EXAMINATION AT A HEALTH CARE FACILITY: Primary | ICD-10-CM

## 2020-10-28 DIAGNOSIS — F41.9 ANXIETY: ICD-10-CM

## 2020-10-28 DIAGNOSIS — F10.21 ALCOHOL DEPENDENCE IN REMISSION (H): ICD-10-CM

## 2020-10-28 DIAGNOSIS — F32.5 MAJOR DEPRESSION IN REMISSION (H): ICD-10-CM

## 2020-10-28 DIAGNOSIS — E78.5 HYPERLIPIDEMIA LDL GOAL <160: ICD-10-CM

## 2020-10-28 PROCEDURE — 99396 PREV VISIT EST AGE 40-64: CPT | Performed by: FAMILY MEDICINE

## 2020-11-20 DIAGNOSIS — E78.5 HYPERLIPIDEMIA LDL GOAL <160: ICD-10-CM

## 2020-11-20 RX ORDER — SIMVASTATIN 20 MG
TABLET ORAL
Qty: 90 TABLET | Refills: 3 | Status: SHIPPED | OUTPATIENT
Start: 2020-11-20 | End: 2021-12-10

## 2021-03-01 ENCOUNTER — VIRTUAL VISIT (OUTPATIENT)
Dept: INTERNAL MEDICINE | Facility: CLINIC | Age: 56
End: 2021-03-01
Payer: COMMERCIAL

## 2021-03-01 ENCOUNTER — TELEPHONE (OUTPATIENT)
Dept: FAMILY MEDICINE | Facility: CLINIC | Age: 56
End: 2021-03-01

## 2021-03-01 DIAGNOSIS — T50.Z95A ADVERSE EFFECT OF VACCINE, INITIAL ENCOUNTER: Primary | ICD-10-CM

## 2021-03-01 PROCEDURE — 99207 PR NO CHARGE LOS: CPT | Mod: TEL | Performed by: INTERNAL MEDICINE

## 2021-03-01 NOTE — TELEPHONE ENCOUNTER
Received 2nd dose of Moderna covid vaccine yesterday. Updated historical admins with dates. Is experiencing fatigue and nausea today. States she works from home but reports onsite to work as well. Because she reported 2 symptoms of covid on the decision tree she is now needing a note from a provider stating ok to return to work. No fever or chills. Advised to schedule a telephone visit and this was scheduled for today.    Tamika Up RN  [facility]

## 2021-03-01 NOTE — LETTER
March 1, 2021      Yessica Regalado  [address] [facility] [address]        To Whom It May Concern:    Yessica Regalado was seen virtually in our clinic. She may return to work without restrictions today.      Sincerely,        Valery Samano MD

## 2021-03-01 NOTE — PROGRESS NOTES
Yessica is a 56 year old who is being evaluated via a billable telephone visit.    Start 11:50 AM   What phone number would you like to be contacted at? [phone]   How would you like to obtain your AVS? Mail a copy    Assessment & Plan     Adverse effect of vaccine, initial encounter  Letter written to return to work.  I do not feel she has covid.         6 minutes spent on the date of the encounter doing chart review, history and exam, documentation and further activities as noted above           No follow-ups on file.    Valery Samano MD  [facility] [place] [facility]\""    Subjective   Yessica is a 56 year old who presents for the following health issues     HPI     She had second covid shot at 11:15 am and felt fine.  This morning she felt tired and nauseated.  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  Her work is requiring her to get a note as these meet two of the symptoms of covid.          Chief Complaint   Patient presents with     Fatigue     and nausea, since this morning-had 2nd Covid injection yesterday at 11am.     Letter Request     or e-mail for pt to return to work           Review of Systems    ROS: 4 point ROS neg other than the symptoms noted above in the HPI.        Objective           Vitals:  No vitals were obtained today due to virtual visit.    Physical Exam   healthy, alert and no distress  PSYCH: Alert and oriented times 3; coherent speech, normal   rate and volume, able to articulate logical thoughts, able   to abstract reason, no tangential thoughts, no hallucinations   or delusions  Her affect is normal  RESP: No cough, no audible wheezing, able to talk in full sentences  Remainder of exam unable to be completed due to telephone visits              Stop 11:55 AM   Phone call duration: 5 minutes

## 2021-12-09 DIAGNOSIS — E78.5 HYPERLIPIDEMIA LDL GOAL <160: ICD-10-CM

## 2021-12-10 RX ORDER — SIMVASTATIN 20 MG
TABLET ORAL
Qty: 90 TABLET | Refills: 0 | Status: SHIPPED | OUTPATIENT
Start: 2021-12-10 | End: 2022-04-07

## 2021-12-16 DIAGNOSIS — E78.5 HYPERLIPIDEMIA LDL GOAL <160: ICD-10-CM

## 2021-12-17 RX ORDER — SIMVASTATIN 20 MG
TABLET ORAL
Qty: 90 TABLET | Refills: 0 | OUTPATIENT
Start: 2021-12-17

## 2022-04-07 ENCOUNTER — OFFICE VISIT (OUTPATIENT)
Dept: FAMILY MEDICINE | Facility: CLINIC | Age: 57
End: 2022-04-07
Payer: COMMERCIAL

## 2022-04-07 VITALS
BODY MASS INDEX: 34.87 KG/M2 | DIASTOLIC BLOOD PRESSURE: 82 MMHG | HEIGHT: 66 IN | TEMPERATURE: 99.5 F | OXYGEN SATURATION: 97 % | SYSTOLIC BLOOD PRESSURE: 136 MMHG | RESPIRATION RATE: 20 BRPM | WEIGHT: 217 LBS | HEART RATE: 79 BPM

## 2022-04-07 DIAGNOSIS — E78.5 HYPERLIPIDEMIA LDL GOAL <160: ICD-10-CM

## 2022-04-07 DIAGNOSIS — F41.9 ANXIETY: ICD-10-CM

## 2022-04-07 DIAGNOSIS — F32.5 MAJOR DEPRESSION IN REMISSION (H): ICD-10-CM

## 2022-04-07 DIAGNOSIS — F10.21 ALCOHOL DEPENDENCE IN REMISSION (H): ICD-10-CM

## 2022-04-07 DIAGNOSIS — Z00.00 ROUTINE GENERAL MEDICAL EXAMINATION AT A HEALTH CARE FACILITY: ICD-10-CM

## 2022-04-07 DIAGNOSIS — Z12.11 SCREEN FOR COLON CANCER: ICD-10-CM

## 2022-04-07 LAB
CHOLEST SERPL-MCNC: 189 MG/DL
FASTING STATUS PATIENT QL REPORTED: NO
FASTING STATUS PATIENT QL REPORTED: YES
GLUCOSE BLD-MCNC: 95 MG/DL (ref 70–99)
HDLC SERPL-MCNC: 53 MG/DL
LDLC SERPL CALC-MCNC: 108 MG/DL
NONHDLC SERPL-MCNC: 136 MG/DL
TRIGL SERPL-MCNC: 142 MG/DL
TSH SERPL DL<=0.005 MIU/L-ACNC: 1.21 MU/L (ref 0.4–4)

## 2022-04-07 PROCEDURE — 99396 PREV VISIT EST AGE 40-64: CPT | Performed by: FAMILY MEDICINE

## 2022-04-07 PROCEDURE — 36415 COLL VENOUS BLD VENIPUNCTURE: CPT | Performed by: FAMILY MEDICINE

## 2022-04-07 PROCEDURE — 84443 ASSAY THYROID STIM HORMONE: CPT | Performed by: FAMILY MEDICINE

## 2022-04-07 PROCEDURE — 80061 LIPID PANEL: CPT | Performed by: FAMILY MEDICINE

## 2022-04-07 PROCEDURE — 82947 ASSAY GLUCOSE BLOOD QUANT: CPT | Performed by: FAMILY MEDICINE

## 2022-04-07 RX ORDER — SIMVASTATIN 20 MG
20 TABLET ORAL AT BEDTIME
Qty: 90 TABLET | Refills: 3 | Status: SHIPPED | OUTPATIENT
Start: 2022-04-07 | End: 2023-08-21

## 2022-04-07 ASSESSMENT — ENCOUNTER SYMPTOMS
NERVOUS/ANXIOUS: 0
FREQUENCY: 0
SHORTNESS OF BREATH: 0
HEADACHES: 0
PARESTHESIAS: 0
ABDOMINAL PAIN: 0
DYSURIA: 0
CHILLS: 0
WEAKNESS: 0
CONSTIPATION: 0
JOINT SWELLING: 0
NAUSEA: 0
HEMATOCHEZIA: 0
EYE PAIN: 0
HEMATURIA: 0
SORE THROAT: 0
COUGH: 0
BREAST MASS: 0
PALPITATIONS: 0
FEVER: 0
ARTHRALGIAS: 0
HEARTBURN: 0
MYALGIAS: 0
DIZZINESS: 0
DIARRHEA: 0

## 2022-04-07 ASSESSMENT — PATIENT HEALTH QUESTIONNAIRE - PHQ9
SUM OF ALL RESPONSES TO PHQ QUESTIONS 1-9: 0
SUM OF ALL RESPONSES TO PHQ QUESTIONS 1-9: 0
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
## 2022-04-07 NOTE — LETTER
April 7, 2022    Yessica Regalado  [address] [facility] [address]          Dear ,    We are writing to inform you of your test results.    Normal Blood sugar   Normal Thyroid test   Cholesterol is stable       Resulted Orders   TSH with free T4 reflex   Result Value Ref Range    TSH 1.21 0.40 - 4.00 mU/L   Glucose   Result Value Ref Range    Glucose 95 70 - 99 mg/dL    Patient Fasting > 8hrs? No    Lipid panel reflex to direct LDL Non-fasting   Result Value Ref Range    Cholesterol 189 <200 mg/dL    Triglycerides 142 <150 mg/dL    Direct Measure HDL 53 >=50 mg/dL    LDL Cholesterol Calculated 108 (H) <=100 mg/dL    Non HDL Cholesterol 136 (H) <130 mg/dL    Patient Fasting > 8hrs? Yes     Narrative    Cholesterol  Desirable:  <200 mg/dL    Triglycerides  Normal:  Less than 150 mg/dL  Borderline High:  150-199 mg/dL  High:  200-499 mg/dL  Very High:  Greater than or equal to 500 mg/dL    Direct Measure HDL  Female:  Greater than or equal to 50 mg/dL   Male:  Greater than or equal to 40 mg/dL    LDL Cholesterol  Desirable:  <100mg/dL  Above Desirable:  100-129 mg/dL   Borderline High:  130-159 mg/dL   High:  160-189 mg/dL   Very High:  >= 190 mg/dL    Non HDL Cholesterol  Desirable:  130 mg/dL  Above Desirable:  130-159 mg/dL  Borderline High:  160-189 mg/dL  High:  190-219 mg/dL  Very High:  Greater than or equal to 220 mg/dL       If you have any questions or concerns, please call the clinic at the number listed above.       Sincerely,      Danika Hammond MD

## 2022-04-07 NOTE — PATIENT INSTRUCTIONS
Preventive Health Recommendations  Female Ages 50 - 64    Yearly exam: See your health care provider every year in order to  o Review health changes.   o Discuss preventive care.    o Review your medicines if your doctor has prescribed any.      Get a Pap test every three years (unless you have an abnormal result and your provider advises testing more often).    If you get Pap tests with HPV test, you only need to test every 5 years, unless you have an abnormal result.     You do not need a Pap test if your uterus was removed (hysterectomy) and you have not had cancer.    You should be tested each year for STDs (sexually transmitted diseases) if you're at risk.     Have a mammogram every 1 to 2 years.    Have a colonoscopy at age 50, or have a yearly FIT test (stool test). These exams screen for colon cancer.      Have a cholesterol test every 5 years, or more often if advised.    Have a diabetes test (fasting glucose) every three years. If you are at risk for diabetes, you should have this test more often.     If you are at risk for osteoporosis (brittle bone disease), think about having a bone density scan (DEXA).    Shots: Get a flu shot each year. Get a tetanus shot every 10 years.    Nutrition:     Eat at least 5 servings of fruits and vegetables each day.    Eat whole-grain bread, whole-wheat pasta and brown rice instead of white grains and rice.    Get adequate Calcium and Vitamin D.     Lifestyle    Exercise at least 150 minutes a week (30 minutes a day, 5 days a week). This will help you control your weight and prevent disease.    Limit alcohol to one drink per day.    No smoking.     Wear sunscreen to prevent skin cancer.     See your dentist every six months for an exam and cleaning.    See your eye doctor every 1 to 2 years.    Patient Education     Mediterranean Diet  A heart healthy eating plan  The Mediterranean Diet is based on the eating habits of people in countries near the [place].  People living in this part of the world have long lives and low rates of chronic diseases. They have lower rates of death from heart disease, cancer and other illnesses.  When you follow this eating plan you'll have better control of your blood sugar and weight. The plan requires simple changes in your habits of eating, and the whole family can take part.  Mediterranean lifestyle   Enjoy eating with others. Sit at the table with family and friends and enjoy your meal. When you eat slowly, you are able to tune in to your body's hunger and fullness signals. You're less likely to overeat.  Be physically active: Being active every day is important for overall good health. Run, walk, dance and do lighter activities such as house and yard work. Move more and sit less!  Drink plenty of water during the day.  Drink red wine with meals in modest amounts (optional).  The Mediterranean Pyramid   The pyramid shows the food groups and the amounts eaten in relation to the whole diet. It is more than a diet; it is also a life-style plan.  The largest food group at bottom contains plant foods: vegetables, fruits, grains, nuts, legumes, seeds, olives and olive oil. These foods make up the largest part of your meals.   The next groups above: fish and seafood, then poultry, cheese, eggs and yogurt are eaten less often and in smaller servings.   The top group, meats and sweets, are eaten the least often and in the smallest amounts.    Tips for adding plant foods to your meals   Vegetables and fruits:     Aim for 3 to 8 servings each day. A serving is   to 2 cups, depending on the food.    Choose a variety of colors. Big green salads are a great way to include several vegetable servings.    Try fresh fruit as a dessert: oranges, grapes, apples pomegranates or fresh figs.    At home keep fresh fruit in a bowl to tempt family.    Bring fruit and vegetables to work for a snack.  Oil     Replace butter and margarine with healthy fats such  "as olive oil. Other plant-based oils are canola, walnut and peanut oil. These are high in good fats. Use them in cooking, salad dressings and baking.    Drizzle your bread with olive oil instead of butter or margarine. Use herbs and spices to add flavor and aroma and to reduce fat and salt when cooking.  Whole grains    Look for the term \"whole\" or \"whole grain\" on the package. Processing grains removes vitamins, minerals and fiber. Whole grains may include: corn, wheat, oats, rye, rice and barley.    Examples of Mediterranean grains include: barley, farro, buckwheat, bulgur, couscous, and wheatberries.    Slowly switch to a whole grain by using whole-grain blends of pastas and rice. Or mix whole grains with refined; for example, mix whole-wheat pasta with white pasta.  Beans and legumes     Beans are a good source of protein and fiber, adding flavor and texture to dishes. Examples include cannellini beans, chickpea, jyothi beans, green beans, kidney beans, lentils and split peas.    Cook a vegetarian meal one night a week: use beans or legumes with vegetables and grains.    Nuts are high in healthy fats. Try walnuts, almonds, pine nuts, hazelnuts and cashews. Avoid candied, honey-roasted and heavily salted nuts.    Limit your intake of nuts to a small handful each day. Explore ways to add to nuts to salads and other dishes.  Tips for using fish and seafood in your meals    Aim for meals with fish or shellfish at least twice a week.    Tuna, herring, salmon and sardines are rich in heart-healthy omega-3. Shellfish, such as mussels, oysters and clams, have similar benefits for brain and heart health.  Tips for using poultry, eggs and cheese and yogurt in your meals    Eat poultry or eggs at least twice a week.    Roast, broil or grill your poultry. Season with fresh or dried herbs.    Enjoy low-fat cheese or yogurt every day.  Tips for using red meat and sweets in your meals     Limit lean red meat to one time a week " or 4 times a month.    Red meat has more saturated fats. Choose a lean cut, like top loin, sirloin, flank steak, strip steak or 90% lean ground beef.    Limit portions to 3 to 4 ounces.    Make whole grains and vegetables the main focus of a meal. Add meat in small amounts for flavor.    Limit sweets such as ice cream or cookies for a special times or holidays.  Snacks    Snack on a handful of almonds, walnuts or sunflower seeds in place of chips, cookies or other processed snack foods.    Calcium-rich, low-fat cheese or low- and nonfat plain yogurt with fresh fruit are healthy snacks that are easy to take with you.  Like to know more?  For tips on shoppping, cooking and eating well the Mediterranean way, go to the [employer] website at www.[employer]Web.  For informational purposes only. Not to replace the advice of your health care provider.   Copyright   2014 [place] [employer] [facility]. All rights reserved.   Mediterranean pyramrid used with permission of the [employer]. [place] 765709 - 09/15.           Patient Education     Understanding the Mediterranean Diet  A Mediterranean-style diet is a healthy way of eating, not a specific diet to lose weight. It includes a lot of foods from plants such as vegetables, fruits, and whole grains, plus olive oil and seafood. It also includes dairy foods and meats, but in smaller amounts. And it includes a moderate amount of wine. Many studies over time have shown health benefits to eating this way. It focuses on making fresh food that s full of flavor.  This plan of eating is inspired by how people eat in countries around the [place] Sea. They include [place], [place], [place], [place], [place], and [place]. But it uses foods you can buy in almost any grocery store.  Health benefits of a Mediterranean diet  This diet is high in fiber, lean protein, and healthy oils. It s low in saturated fats and sugar. The diet has been shown to help prevent or  manage:    Depression    Diabetes    Heart disease    High blood pressure    Parkinson disease    Alzheimer disease    Cancers of the colon, prostate, and breast  What do I eat on a Mediterranean diet?  Plan each meal around vegetables and whole grains. Use olive oil. Add nuts and legumes. Include fish or lean protein. Foods to focus your meals around include:  Food type What to eat   Vegetables This includes leafy greens, tomatoes, squash, peppers, cucumbers, green beans, eggplant, avocados, potatoes, and olives. You can use fresh or frozen vegetables.   Fruits This includes apples, raspberries, strawberries, grapes, citrus fruit such as oranges and grapefruit, stone fruit such as apricots and peaches, plus figs, dates, and melon.   Whole grains This includes brown rice, whole oats, quinoa, millet, whole grain bread, whole-wheat pasta, and crackers made with whole grains.   Beans and legumes These include lentils, chickpeas, and beans such as gomez, jyothi, kidney, and black beans. Peanuts are also legumes.   Seafood This includes fish such as salmon, trout, mackerel, shawanda, tuna, sardines, anchovies, and whitefish. It also includes shellfish such as shrimp, oysters, mussels, and clams.   Nuts and seeds These include walnuts, almonds, sunflower seeds, cashews, brazil nuts, and pecans.   Healthy oil Olive oil is the most common oil in the Mediterranean diet. But other healthy oils are canola, sunflower, safflower, and corn oils.   Herbs and spices Season food with oregano, pepper, mitra, tarragon, thyme, basil, cinnamon, and cumin.   Wine Enjoy a glass of wine each day with a meal. Skip this if you need to not have alcohol for any reason.   Foods to eat in smaller amounts  You can add these foods in a few days a week, in smaller amounts:    Dairy foods, such as cheese, yogurt, and butter    Poultry, such as chicken and duck    Eggs  Occasional treats  Limit these foods in your weekly eating plan:    Red meats, such  as beef, lamb, and pork    Refined grains, such as white rice and foods made with white flour    Sugary treats, such as chocolate, candy, or pastries  Adding lots of flavor  You can liven up fresh foods with many kinds of flavor. Try these sauces, dips, and seasonings:    Tano    Marinara sauce    Salsa    Vinaigrette dressing  Tips for eating out  At restaurants:    Skip fried foods. These have a lot of saturated fat.    Look for fish dishes that are cooked without cream or butter.    Pick salads that have nuts and seeds.    Choose vegetarian options that don t have too much cheese.  [employer] last reviewed this educational content on 5/1/2020 2000-2021 [employer]. All rights reserved. This information is not intended as a substitute for professional medical care. Always follow your healthcare professional's instructions.

## 2022-04-11 ENCOUNTER — ANCILLARY PROCEDURE (OUTPATIENT)
Dept: MAMMOGRAPHY | Facility: CLINIC | Age: 57
End: 2022-04-11
Payer: COMMERCIAL

## 2022-04-11 DIAGNOSIS — Z00.00 PREVENTATIVE HEALTH CARE: ICD-10-CM

## 2022-04-11 PROCEDURE — 77067 SCR MAMMO BI INCL CAD: CPT | Mod: TC | Performed by: RADIOLOGY

## 2023-04-20 ENCOUNTER — PATIENT OUTREACH (OUTPATIENT)
Dept: CARE COORDINATION | Facility: CLINIC | Age: 58
End: 2023-04-20
Payer: COMMERCIAL

## 2023-08-21 DIAGNOSIS — E78.5 HYPERLIPIDEMIA LDL GOAL <160: ICD-10-CM

## 2023-08-21 RX ORDER — SIMVASTATIN 20 MG
20 TABLET ORAL AT BEDTIME
Qty: 30 TABLET | Refills: 0 | Status: SHIPPED | OUTPATIENT
Start: 2023-08-21 | End: 2024-01-17

## 2023-08-22 RX ORDER — SIMVASTATIN 20 MG
20 TABLET ORAL AT BEDTIME
Qty: 90 TABLET | Refills: 0 | OUTPATIENT
Start: 2023-08-22

## 2023-10-30 ENCOUNTER — TELEPHONE (OUTPATIENT)
Dept: FAMILY MEDICINE | Facility: CLINIC | Age: 58
End: 2023-10-30
Payer: COMMERCIAL

## 2023-10-30 ENCOUNTER — NURSE TRIAGE (OUTPATIENT)
Dept: FAMILY MEDICINE | Facility: CLINIC | Age: 58
End: 2023-10-30
Payer: COMMERCIAL

## 2023-10-30 NOTE — TELEPHONE ENCOUNTER
Reason for Call:  Appointment Request     Patient requesting this type of appt:  office visit     Requested provider: Danika Hammond     Reason patient unable to be scheduled: Not within requested timeframe     When does patient want to be seen/preferred time: Same day     Comments: Pt would like same day appt, has been having ongoing cough 5-6 wks, would like to have chest listened to, etc, in person.      Okay to leave a detailed message?: Yes at Work number on file:  [phone] (work)     Call taken on 10/30/2023 at 2:28 PM by Elenita Esposito

## 2023-10-30 NOTE — TELEPHONE ENCOUNTER
Attempted to call to triage cough. Left vm asking call back triage nurse.     Thanks,  Lynn, RN  [facility]

## 2023-10-30 NOTE — TELEPHONE ENCOUNTER
Reason for Call:  Appointment Request    Patient requesting this type of appt:  office visit    Requested provider: Danika Hammond    Reason patient unable to be scheduled: Not within requested timeframe    When does patient want to be seen/preferred time: Same day    Comments: Pt would like same day appt, has been having ongoing cough 5-6 wks, would like to have chest listened to, etc, in person.     Okay to leave a detailed message?: Yes at Work number on file:  [phone] (work)    Call taken on 10/30/2023 at 2:28 PM by Elenita Esposito

## 2023-10-30 NOTE — TELEPHONE ENCOUNTER
Scheduled pt for same day provider 10/31    Reason for Disposition   Patient wants to be seen    Additional Information   Negative: Bluish (or gray) lips or face   Negative: SEVERE difficulty breathing (e.g., struggling for each breath, speaks in single words)   Negative: Rapid onset of cough and has hives   Negative: Coughing started suddenly after medicine, an allergic food or bee sting   Negative: Difficulty breathing after exposure to flames, smoke, or fumes   Negative: Sounds like a life-threatening emergency to the triager   Negative: Previous asthma attacks and this feels like asthma attack   Negative: Dry cough (non-productive; no sputum or minimal clear sputum) and within 14 days of COVID-19 Exposure   Negative: MODERATE difficulty breathing (e.g., speaks in phrases, SOB even at rest, pulse 100-120) and still present when not coughing   Negative: Chest pain present when not coughing   Negative: Passed out (i.e., fainted, collapsed and was not responding)   Negative: Patient sounds very sick or weak to the triager   Negative: MILD difficulty breathing (e.g., minimal/no SOB at rest, SOB with walking, pulse <100) and still present when not coughing   Negative: Coughed up > 1 tablespoon (15 ml) blood (Exception: Blood-tinged sputum.)   Negative: Fever > 103 F (39.4 C)   Negative: Fever > 101 F (38.3 C) and over 60 years of age   Negative: Fever > 100.0 F (37.8 C) and has diabetes mellitus or a weak immune system (e.g., HIV positive, cancer chemotherapy, organ transplant, splenectomy, chronic steroids)   Negative: Fever > 100.0 F (37.8 C) and bedridden (e.g., CVA, chronic illness, recovering from surgery)   Negative: Increasing ankle swelling   Negative: Wheezing is present   Negative: SEVERE coughing spells (e.g., whooping sound after coughing, vomiting after coughing)   Negative: Coughing up mahsa-colored (reddish-brown) or blood-tinged sputum   Negative: Fever present > 3 days (72 hours)   Negative: Fever  "returns after gone for over 24 hours and symptoms worse or not improved   Negative: Using nasal washes and pain medicine > 24 hours and sinus pain persists   Negative: Known COPD or other severe lung disease (i.e., bronchiectasis, cystic fibrosis, lung surgery) and worsening symptoms (i.e., increased sputum purulence or amount, increased breathing difficulty)   Negative: Continuous (nonstop) coughing interferes with work or school and no improvement using cough treatment per Care Advice    Answer Assessment - Initial Assessment Questions  1. ONSET: \"When did the cough begin?\"       September and cough has just lingered  2. SEVERITY: \"How bad is the cough today?\"       Cough sporadically throughout the day  3. SPUTUM: \"Describe the color of your sputum\" (none, dry cough; clear, white, yellow, green)      Greenish to yellow  4. HEMOPTYSIS: \"Are you coughing up any blood?\" If so ask: \"How much?\" (flecks, streaks, tablespoons, etc.)      no  5. DIFFICULTY BREATHING: \"Are you having difficulty breathing?\" If Yes, ask: \"How bad is it?\" (e.g., mild, moderate, severe)     - MILD: No SOB at rest, mild SOB with walking, speaks normally in sentences, can lie down, no retractions, pulse < 100.     - MODERATE: SOB at rest, SOB with minimal exertion and prefers to sit, cannot lie down flat, speaks in phrases, mild retractions, audible wheezing, pulse 100-120.     - SEVERE: Very SOB at rest, speaks in single words, struggling to breathe, sitting hunched forward, retractions, pulse > 120       no  6. FEVER: \"Do you have a fever?\" If Yes, ask: \"What is your temperature, how was it measured, and when did it start?\"      no  7. CARDIAC HISTORY: \"Do you have any history of heart disease?\" (e.g., heart attack, congestive heart failure)       no  8. LUNG HISTORY: \"Do you have any history of lung disease?\"  (e.g., pulmonary embolus, asthma, emphysema)      no  9. PE RISK FACTORS: \"Do you have a history of blood clots?\" (or: recent major " "surgery, recent prolonged travel, bedridden)      no  10. OTHER SYMPTOMS: \"Do you have any other symptoms?\" (e.g., runny nose, wheezing, chest pain)        congestion  11. PREGNANCY: \"Is there any chance you are pregnant?\" \"When was your last menstrual period?\"        no  12. TRAVEL: \"Have you traveled out of the country in the last month?\" (e.g., travel history, exposures)        no    Protocols used: Calos Velasquez RN    Triage Nurse  [facility]      "

## 2023-10-31 ENCOUNTER — OFFICE VISIT (OUTPATIENT)
Dept: FAMILY MEDICINE | Facility: CLINIC | Age: 58
End: 2023-10-31
Payer: COMMERCIAL

## 2023-10-31 VITALS
OXYGEN SATURATION: 95 % | WEIGHT: 215 LBS | SYSTOLIC BLOOD PRESSURE: 150 MMHG | HEIGHT: 68 IN | DIASTOLIC BLOOD PRESSURE: 86 MMHG | HEART RATE: 77 BPM | TEMPERATURE: 98.2 F | BODY MASS INDEX: 32.58 KG/M2 | RESPIRATION RATE: 18 BRPM

## 2023-10-31 DIAGNOSIS — R09.82 POST-NASAL DRIP: ICD-10-CM

## 2023-10-31 DIAGNOSIS — R05.9 COUGH, UNSPECIFIED TYPE: Primary | ICD-10-CM

## 2023-10-31 PROCEDURE — 99213 OFFICE O/P EST LOW 20 MIN: CPT | Performed by: PHYSICIAN ASSISTANT

## 2023-10-31 RX ORDER — FLUTICASONE PROPIONATE 50 MCG
1 SPRAY, SUSPENSION (ML) NASAL DAILY
Qty: 16 G | Refills: 0 | Status: SHIPPED | OUTPATIENT
Start: 2023-10-31 | End: 2024-01-17

## 2023-10-31 RX ORDER — GUAIFENESIN AND DEXTROMETHORPHAN HYDROBROMIDE 600; 30 MG/1; MG/1
1 TABLET, EXTENDED RELEASE ORAL EVERY 12 HOURS
Qty: 14 TABLET | Refills: 0 | Status: SHIPPED | OUTPATIENT
Start: 2023-10-31 | End: 2024-01-17

## 2023-10-31 ASSESSMENT — ENCOUNTER SYMPTOMS
WHEEZING: 0
SORE THROAT: 0
FATIGUE: 0
FEVER: 0
HEADACHES: 0
MYALGIAS: 0
SINUS PAIN: 0
SINUS PRESSURE: 0
CHEST TIGHTNESS: 0
RHINORRHEA: 0
CHILLS: 0
COUGH: 1
SHORTNESS OF BREATH: 0

## 2023-10-31 ASSESSMENT — PATIENT HEALTH QUESTIONNAIRE - PHQ9
SUM OF ALL RESPONSES TO PHQ QUESTIONS 1-9: 0
SUM OF ALL RESPONSES TO PHQ QUESTIONS 1-9: 0

## 2023-10-31 ASSESSMENT — PAIN SCALES - GENERAL: PAINLEVEL: NO PAIN (0)

## 2023-10-31 NOTE — PROGRESS NOTES
Assessment & Plan     Cough, unspecified type  Post-nasal drip  Patient is a 58-year-old female who presents to clinic due to viral URI which started 6 weeks ago.  Symptoms have primarily resolved, but patient notes ongoing persistent cough.  Vital signs without fever or tachycardia.  Physical exam significant for nasal congestion and clear rhinorrhea.  Cough is likely due to postnasal drip.  Mucinex DM and Flonase nasal spray prescribed.  Discussed expected course of recovery.  Return precautions provided.  - dextromethorphan-guaiFENesin (MUCINEX DM)  MG 12 hr tablet; Take 1 tablet by mouth every 12 hours  - fluticasone (FLONASE) 50 MCG/ACT nasal spray; Spray 1 spray into both nostrils daily        See Patient Instructions    Verónica Meyer PA-C  [employer] AVRIL Juan is a 58 year old, presenting for the following health issues:  Cough      10/31/2023     7:57 AM   Additional Questions   Roomed by Bonny ANN MA   Accompanied by No one         10/31/2023     7:57 AM   Patient Reported Additional Medications   Patient reports taking the following new medications None       History of Present Illness       Reason for visit:  Cough  Symptom onset:  More than a month  Symptom intensity:  Moderate  Symptom progression:  Staying the same  Had these symptoms before:  No  What makes it worse:  No  What makes it better:  No    She eats 2-3 servings of fruits and vegetables daily.She consumes 0 sweetened beverage(s) daily.She exercises with enough effort to increase her heart rate 9 or less minutes per day.  She exercises with enough effort to increase her heart rate 3 or less days per week.   She is taking medications regularly.     Patient notes cough ongoing for around 6 weeks. She notes sore throat, congestion, and feeling unwell at the start of symptoms. Cough has persisted and is intermittently productive. No fever, chest pain, wheezing, dyspnea. Patient is taking Guaifenesin. No  "history of asthma.       Review of Systems   Constitutional:  Negative for chills, fatigue and fever.   HENT:  Negative for congestion, rhinorrhea, sinus pressure, sinus pain and sore throat.    Respiratory:  Positive for cough. Negative for chest tightness, shortness of breath and wheezing.    Cardiovascular:  Negative for chest pain.   Musculoskeletal:  Negative for myalgias.   Neurological:  Negative for headaches.            Objective    BP (!) 150/86   Pulse 77   Temp 98.2  F (36.8  C) (Temporal)   Resp 18   Ht 1.715 m (5' 7.5\")   Wt 97.5 kg (215 lb)   LMP 12/20/2010 (Exact Date)   SpO2 95%   Breastfeeding No   BMI 33.18 kg/m    Body mass index is 33.18 kg/m .  Physical Exam  Vitals and nursing note reviewed.   Constitutional:       General: She is not in acute distress.     Appearance: Normal appearance.   HENT:      Head: Normocephalic and atraumatic.      Nose: Congestion and rhinorrhea present.      Mouth/Throat:      Mouth: Mucous membranes are moist.      Pharynx: Oropharynx is clear. No oropharyngeal exudate or posterior oropharyngeal erythema.   Eyes:      Extraocular Movements: Extraocular movements intact.      Pupils: Pupils are equal, round, and reactive to light.   Cardiovascular:      Rate and Rhythm: Normal rate and regular rhythm.      Heart sounds: Normal heart sounds.   Pulmonary:      Effort: Pulmonary effort is normal.      Breath sounds: Normal breath sounds. No wheezing, rhonchi or rales.   Musculoskeletal:         General: Normal range of motion.      Cervical back: Normal range of motion.   Lymphadenopathy:      Cervical: No cervical adenopathy.   Skin:     General: Skin is warm and dry.   Neurological:      General: No focal deficit present.      Mental Status: She is alert.   Psychiatric:         Mood and Affect: Mood normal.         Behavior: Behavior normal.                              "

## 2023-10-31 NOTE — PATIENT INSTRUCTIONS
Your exam is reassuring today.  Your ongoing cough is likely from nasal drainage.  You have been prescribed 2 medications to help with this.  One is Mucinex DM which will help with cough and congestion.  The other is Flonase nasal spray which will help dry up the drainage.  Together these should help improve her cough.  Please reach out with any questions or concerns.  Follow-up in clinic for any new or worsening symptoms.

## 2023-11-09 DIAGNOSIS — Z12.11 COLON CANCER SCREENING: ICD-10-CM

## 2023-11-23 ENCOUNTER — LAB (OUTPATIENT)
Dept: FAMILY MEDICINE | Facility: CLINIC | Age: 58
End: 2023-11-23
Payer: COMMERCIAL

## 2023-11-23 DIAGNOSIS — Z12.11 COLON CANCER SCREENING: ICD-10-CM

## 2024-01-06 LAB — NONINV COLON CA DNA+OCC BLD SCRN STL QL: POSITIVE

## 2024-01-08 ENCOUNTER — TELEPHONE (OUTPATIENT)
Dept: FAMILY MEDICINE | Facility: CLINIC | Age: 59
End: 2024-01-08
Payer: COMMERCIAL

## 2024-01-08 DIAGNOSIS — R19.5 POSITIVE COLORECTAL CANCER SCREENING USING COLOGUARD TEST: Primary | ICD-10-CM

## 2024-01-08 NOTE — TELEPHONE ENCOUNTER
Left message on answering machine for patient to call back to the nurse at [phone].    Tamika Up RN  [facility]

## 2024-01-08 NOTE — RESULT ENCOUNTER NOTE
Please call Pt  [place] is POSITIVE  This is a screen test for colon cancer  Please get a colonoscopy for Further evaluation  It is Important you get this test asap

## 2024-01-08 NOTE — TELEPHONE ENCOUNTER
Danika Hammond MD  1/8/2024  2:04 PM CST Back to Top      Please call Pt  [facility] is POSITIVE  This is a screen test for colon cancer  Please get a colonoscopy for Further evaluation  It is Important you get this test asap

## 2024-01-09 ENCOUNTER — TELEPHONE (OUTPATIENT)
Dept: GASTROENTEROLOGY | Facility: CLINIC | Age: 59
End: 2024-01-09
Payer: COMMERCIAL

## 2024-01-09 NOTE — TELEPHONE ENCOUNTER
Attempted to contact patient in order to complete pre assessment questions.     No answer. Left message to return call to [phone] option 4      Procedure details:    Patient scheduled for Colonoscopy  on 01.16.2024.     Arrival time: 0930. Procedure time 1015    Pre op exam needed? N/A    Facility location: [facility]; [address], [place] [address]    Sedation type: Conscious sedation     Indication for procedure:   Positive colorectal cancer screening using Cologuard test             Chart review:     Electronic implanted devices? No    Recent diagnosis of diverticulitis within the last 6 weeks? No    Diabetic? No    Diabetic medication HOLDING recommendations: (if applicable)  Oral diabetic medications: N/A  Diabetic injectables: N/A  Insulin: N/A      Medication review:    Anticoagulants? No    NSAIDS? No NSAID medications per patient's medication list.  RN will verify with pre-assessment call.    Other medication HOLDING recommendations:  N/A      Prep for procedure:     Bowel prep recommendation: Standard Miralax  Due to: standard bowel prep.    Prep instructions sent via letter.     Tamika Zambrano RN  Endoscopy Procedure Pre Assessment RN

## 2024-01-09 NOTE — TELEPHONE ENCOUNTER
"Endoscopy Scheduling Screen    Have you had a positive Covid test in the last 14 days?  No    Are you active on MyChart?   No    What insurance is in the chart?  Other:      Ordering/Referring Provider: Danika Hammond MD     (If ordering provider performs procedure, schedule with ordering provider unless otherwise instructed. )    BMI: Estimated body mass index is 33.18 kg/m  as calculated from the following:    Height as of 10/31/23: 1.715 m (5' 7.5\").    Weight as of 10/31/23: 97.5 kg (215 lb).     Sedation Ordered  moderate sedation.   If patient BMI > 50 do not schedule in [facility].    If patient BMI > 45 do not schedule at [facility].    Are you taking methadone or Suboxone?  No    Are you taking any prescription medications for pain 3 or more times per week?   NO - No RN review required.    Do you have a history of malignant hyperthermia or adverse reaction to anesthesia?  No    (Females) Are you currently pregnant?   No     Have you been diagnosed or told you have pulmonary hypertension?   No    Do you have an LVAD?  No    Have you been told you have moderate to severe sleep apnea?  No    Have you been told you have COPD, asthma, or any other lung disease?  No    Do you have any heart conditions?  No     Have you ever had an organ transplant?   No    Have you ever had or are you awaiting a heart or lung transplant?   No    Have you had a stroke or transient ischemic attack (TIA aka \"mini stroke\" in the last 6 months?   No    Have you been diagnosed with or been told you have cirrhosis of the liver?   No    Are you currently on dialysis?   No    Do you need assistance transferring?   No    BMI: Estimated body mass index is 33.18 kg/m  as calculated from the following:    Height as of 10/31/23: 1.715 m (5' 7.5\").    Weight as of 10/31/23: 97.5 kg (215 lb).     Is patients BMI > 40 and scheduling location [facility]?  No    Do you take an injectable medication for weight loss or diabetes (excluding insulin)?  No    Do you take " the medication Naltrexone?  No    Do you take blood thinners?  No       Prep   Are you currently on dialysis or do you have chronic kidney disease?  No    Do you have a diagnosis of diabetes?  No    Do you have a diagnosis of cystic fibrosis (CF)?  No    On a regular basis do you go 3 -5 days between bowel movements?  No    BMI > 40?  No    Preferred Pharmacy:      [id number] - [place], [place] - [address] AT [facility] [address]  [address]  [place] [address]  Phone: [phone] Fax: [phone]      Final Scheduling Details   Colonoscopy prep sent?  Standard MiraLAX    Procedure scheduled  Colonoscopy    Surgeon:  SHARONA     Date of procedure:  1/16     Pre-OP / PAC:   No - Not required for this site.    Location  [facility] - Patient preference.    Sedation   Moderate Sedation - Per order.      Patient Reminders:   You will receive a call from a Nurse to review instructions and health history.  This assessment must be completed prior to your procedure.  Failure to complete the Nurse assessment may result in the procedure being cancelled.      On the day of your procedure, please designate an adult(s) who can drive you home stay with you for the next 24 hours. The medicines used in the exam will make you sleepy. You will not be able to drive.      You cannot take public transportation, ride share services, or non-medical taxi service without a responsible caregiver.  Medical transport services are allowed with the requirement that a responsible caregiver will receive you at your destination.  We require that drivers and caregivers are confirmed prior to your procedure.

## 2024-01-09 NOTE — TELEPHONE ENCOUNTER
Patient called clinic back. RN called patient and relayed providers message. Patient verbalized understanding.     RN gave patient phone number for Colonoscopy referral   [phone]       Julee Dao RN on 1/9/2024 at 11:40 AM

## 2024-01-10 NOTE — TELEPHONE ENCOUNTER
Pre assessment completed for upcoming procedure.   (Please see previous telephone encounter notes for complete details)    Patient  returned call.       Procedure details:    Arrival time and facility location reviewed.    Pre op exam needed? N/A    Designated  policy reviewed. Instructed to have someone stay 6 hours post procedure.     COVID policy reviewed.      Medication review:    Medications reviewed. Please see supporting documentation below. Holding recommendations discussed (if applicable).       Prep for procedure:     Procedure prep instructions reviewed.        Additional information needed?  Pt requesting email of prep instructions. Email sent to [email or web address].     Communication requested via unencrypted email. This information includes prep instructions for upcoming procedure.  as acknowledged and agreed to accept the risks to receive unencrypted communications for this incidence.         Patient  verbalized understanding and had no questions or concerns at this time.      Aimee Bhardwaj RN  Endoscopy Procedure Pre Assessment RN  [phone] option 4

## 2024-01-16 ENCOUNTER — HOSPITAL ENCOUNTER (OUTPATIENT)
Facility: AMBULATORY SURGERY CENTER | Age: 59
Discharge: HOME | End: 2024-01-16
Attending: SURGERY | Admitting: SURGERY
Payer: COMMERCIAL

## 2024-01-16 VITALS
OXYGEN SATURATION: 96 % | TEMPERATURE: 97.1 F | RESPIRATION RATE: 16 BRPM | DIASTOLIC BLOOD PRESSURE: 69 MMHG | SYSTOLIC BLOOD PRESSURE: 121 MMHG | HEART RATE: 78 BPM

## 2024-01-16 LAB — COLONOSCOPY: NORMAL

## 2024-01-16 PROCEDURE — 88305 TISSUE EXAM BY PATHOLOGIST: CPT | Performed by: PATHOLOGY

## 2024-01-16 PROCEDURE — 45385 COLONOSCOPY W/LESION REMOVAL: CPT

## 2024-01-16 PROCEDURE — G8918 PT W/O PREOP ORDER IV AB PRO: HCPCS

## 2024-01-16 PROCEDURE — G8907 PT DOC NO EVENTS ON DISCHARG: HCPCS

## 2024-01-16 RX ORDER — FLUMAZENIL 0.1 MG/ML
0.2 INJECTION, SOLUTION INTRAVENOUS
Status: ACTIVE | OUTPATIENT
Start: 2024-01-16 | End: 2024-01-16

## 2024-01-16 RX ORDER — ONDANSETRON 4 MG/1
4 TABLET, ORALLY DISINTEGRATING ORAL EVERY 6 HOURS PRN
Status: DISCONTINUED | OUTPATIENT
Start: 2024-01-16 | End: 2024-01-17 | Stop reason: HOSPADM

## 2024-01-16 RX ORDER — NALOXONE HYDROCHLORIDE 0.4 MG/ML
0.2 INJECTION, SOLUTION INTRAMUSCULAR; INTRAVENOUS; SUBCUTANEOUS
Status: DISCONTINUED | OUTPATIENT
Start: 2024-01-16 | End: 2024-01-17 | Stop reason: HOSPADM

## 2024-01-16 RX ORDER — LIDOCAINE 40 MG/G
CREAM TOPICAL
Status: DISCONTINUED | OUTPATIENT
Start: 2024-01-16 | End: 2024-01-17 | Stop reason: HOSPADM

## 2024-01-16 RX ORDER — PROCHLORPERAZINE MALEATE 10 MG
10 TABLET ORAL EVERY 6 HOURS PRN
Status: DISCONTINUED | OUTPATIENT
Start: 2024-01-16 | End: 2024-01-17 | Stop reason: HOSPADM

## 2024-01-16 RX ORDER — NALOXONE HYDROCHLORIDE 0.4 MG/ML
0.4 INJECTION, SOLUTION INTRAMUSCULAR; INTRAVENOUS; SUBCUTANEOUS
Status: DISCONTINUED | OUTPATIENT
Start: 2024-01-16 | End: 2024-01-17 | Stop reason: HOSPADM

## 2024-01-16 RX ORDER — FENTANYL CITRATE 50 UG/ML
INJECTION, SOLUTION INTRAMUSCULAR; INTRAVENOUS PRN
Status: DISCONTINUED | OUTPATIENT
Start: 2024-01-16 | End: 2024-01-16 | Stop reason: HOSPADM

## 2024-01-16 RX ORDER — ONDANSETRON 2 MG/ML
4 INJECTION INTRAMUSCULAR; INTRAVENOUS EVERY 6 HOURS PRN
Status: DISCONTINUED | OUTPATIENT
Start: 2024-01-16 | End: 2024-01-17 | Stop reason: HOSPADM

## 2024-01-16 RX ORDER — SODIUM CHLORIDE, SODIUM LACTATE, POTASSIUM CHLORIDE, CALCIUM CHLORIDE 600; 310; 30; 20 MG/100ML; MG/100ML; MG/100ML; MG/100ML
INJECTION, SOLUTION INTRAVENOUS CONTINUOUS
Status: DISCONTINUED | OUTPATIENT
Start: 2024-01-16 | End: 2024-01-17 | Stop reason: HOSPADM

## 2024-01-16 RX ORDER — ONDANSETRON 2 MG/ML
4 INJECTION INTRAMUSCULAR; INTRAVENOUS
Status: DISCONTINUED | OUTPATIENT
Start: 2024-01-16 | End: 2024-01-17 | Stop reason: HOSPADM

## 2024-01-16 RX ADMIN — SODIUM CHLORIDE, SODIUM LACTATE, POTASSIUM CHLORIDE, CALCIUM CHLORIDE: 600; 310; 30; 20 INJECTION, SOLUTION INTRAVENOUS at 10:02

## 2024-01-16 NOTE — LETTER
Yessica Regalado  [address] [facility] [address]  January 19, 2024    Dear Yessica,  This letter is to inform you of the results of your pathology report from your colonoscopy.  Your pathology report was:  Final Diagnosis   A.  COLON, DESCENDING, POLYPS:  - Sessile serrated adenomas  - No cytologic dysplasia identified  B.  COLON, SIGMOID, POLYPS:  - Tubular adenomas  - No high-grade dysplasia or malignancy identified  - Hyperplastic polyp   These are benign polyps. These types of polyps do carry a small risk of developing into a cancer over time if not removed. Yours were completely removed at the time of your colonoscopy. You should have another surveillance colonoscopy in 3 years.  If you have further questions please don t hesitate to call our clinic at [phone].   Sincerely,     Paul Gonzalez, DO
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
## 2024-01-17 ENCOUNTER — OFFICE VISIT (OUTPATIENT)
Dept: FAMILY MEDICINE | Facility: CLINIC | Age: 59
End: 2024-01-17
Payer: COMMERCIAL

## 2024-01-17 ENCOUNTER — ANCILLARY PROCEDURE (OUTPATIENT)
Dept: MAMMOGRAPHY | Facility: CLINIC | Age: 59
End: 2024-01-17
Attending: FAMILY MEDICINE
Payer: COMMERCIAL

## 2024-01-17 VITALS
TEMPERATURE: 97.6 F | HEIGHT: 66 IN | OXYGEN SATURATION: 97 % | SYSTOLIC BLOOD PRESSURE: 120 MMHG | DIASTOLIC BLOOD PRESSURE: 79 MMHG | HEART RATE: 78 BPM | WEIGHT: 213 LBS | BODY MASS INDEX: 34.23 KG/M2 | RESPIRATION RATE: 16 BRPM

## 2024-01-17 DIAGNOSIS — F32.5 MAJOR DEPRESSION IN REMISSION (H): ICD-10-CM

## 2024-01-17 DIAGNOSIS — R06.83 SNORING: ICD-10-CM

## 2024-01-17 DIAGNOSIS — F10.21 ALCOHOL DEPENDENCE IN REMISSION (H): ICD-10-CM

## 2024-01-17 DIAGNOSIS — Z12.31 VISIT FOR SCREENING MAMMOGRAM: ICD-10-CM

## 2024-01-17 DIAGNOSIS — Z00.00 ROUTINE HISTORY AND PHYSICAL EXAMINATION OF ADULT: ICD-10-CM

## 2024-01-17 DIAGNOSIS — E78.5 HYPERLIPIDEMIA LDL GOAL <160: ICD-10-CM

## 2024-01-17 DIAGNOSIS — R73.01 IFG (IMPAIRED FASTING GLUCOSE): Primary | ICD-10-CM

## 2024-01-17 DIAGNOSIS — F41.9 ANXIETY: ICD-10-CM

## 2024-01-17 LAB — HBA1C MFR BLD: 6.1 % (ref 0–5.6)

## 2024-01-17 PROCEDURE — 91320 SARSCV2 VAC 30MCG TRS-SUC IM: CPT | Performed by: FAMILY MEDICINE

## 2024-01-17 PROCEDURE — 99396 PREV VISIT EST AGE 40-64: CPT | Mod: 25 | Performed by: FAMILY MEDICINE

## 2024-01-17 PROCEDURE — 90750 HZV VACC RECOMBINANT IM: CPT | Performed by: FAMILY MEDICINE

## 2024-01-17 PROCEDURE — 90480 ADMN SARSCOV2 VAC 1/ONLY CMP: CPT | Performed by: FAMILY MEDICINE

## 2024-01-17 PROCEDURE — 77067 SCR MAMMO BI INCL CAD: CPT | Mod: TC | Performed by: RADIOLOGY

## 2024-01-17 PROCEDURE — 82947 ASSAY GLUCOSE BLOOD QUANT: CPT | Performed by: FAMILY MEDICINE

## 2024-01-17 PROCEDURE — 90678 RSV VACC PREF BIVALENT IM: CPT | Performed by: FAMILY MEDICINE

## 2024-01-17 PROCEDURE — 90472 IMMUNIZATION ADMIN EACH ADD: CPT | Performed by: FAMILY MEDICINE

## 2024-01-17 PROCEDURE — 90471 IMMUNIZATION ADMIN: CPT | Performed by: FAMILY MEDICINE

## 2024-01-17 PROCEDURE — 99214 OFFICE O/P EST MOD 30 MIN: CPT | Mod: 25 | Performed by: FAMILY MEDICINE

## 2024-01-17 PROCEDURE — 83036 HEMOGLOBIN GLYCOSYLATED A1C: CPT | Performed by: FAMILY MEDICINE

## 2024-01-17 PROCEDURE — 36415 COLL VENOUS BLD VENIPUNCTURE: CPT | Performed by: FAMILY MEDICINE

## 2024-01-17 PROCEDURE — 80061 LIPID PANEL: CPT | Performed by: FAMILY MEDICINE

## 2024-01-17 RX ORDER — BUPROPION HYDROCHLORIDE 100 MG/1
200 TABLET, EXTENDED RELEASE ORAL
COMMUNITY
Start: 2024-01-11

## 2024-01-17 RX ORDER — PAROXETINE 30 MG/1
2 TABLET, FILM COATED ORAL
COMMUNITY
Start: 2023-12-20

## 2024-01-17 RX ORDER — SIMVASTATIN 20 MG
20 TABLET ORAL AT BEDTIME
Qty: 90 TABLET | Refills: 3 | Status: SHIPPED | OUTPATIENT
Start: 2024-01-17

## 2024-01-17 ASSESSMENT — ENCOUNTER SYMPTOMS
HEMATOCHEZIA: 0
PARESTHESIAS: 0
HEADACHES: 0
DYSURIA: 0
JOINT SWELLING: 0
NERVOUS/ANXIOUS: 0
PALPITATIONS: 0
ARTHRALGIAS: 0
SHORTNESS OF BREATH: 0
CHILLS: 0
NAUSEA: 0
FEVER: 0
EYE PAIN: 0
DIZZINESS: 0
DIARRHEA: 0
HEMATURIA: 0
BREAST MASS: 0
CONSTIPATION: 0
FREQUENCY: 0
MYALGIAS: 0
SORE THROAT: 0
ABDOMINAL PAIN: 0
HEARTBURN: 0
COUGH: 0
WEAKNESS: 0

## 2024-01-17 ASSESSMENT — PAIN SCALES - GENERAL: PAINLEVEL: NO PAIN (0)

## 2024-01-17 NOTE — PROGRESS NOTES
Preventive Care Visit  [facility] MARY ALICE Hammond MD, Family Medicine  2024       SUBJECTIVE:   Yessica is a 59 year old, presenting for the following:  Physical        2024    11:43 AM   Additional Questions   Roomed by Carli     Healthy Habits:     Getting at least 3 servings of Calcium per day:  Yes    Bi-annual eye exam:  Yes    Dental care twice a year:  Yes    Sleep apnea or symptoms of sleep apnea:  Excessive snoring    Diet:  Regular (no restrictions)    Frequency of exercise:  None    Taking medications regularly:  Yes    Medication side effects:  None    Additional concerns today:  Yes                Hyperlipidemia Follow-Up    Are you regularly taking any medication or supplement to lower your cholesterol?   Yes- statins  Are you having muscle aches or other side effects that you think could be caused by your cholesterol lowering medication?  No    Depression and Anxiety Follow-Up  How are you doing with your depression since your last visit? Stable   How are you doing with your anxiety since your last visit?  Stable   Are you having other symptoms that might be associated with depression or anxiety? No    Do you have any concerns with your use of alcohol or other drugs? No    Social History     Tobacco Use    Smoking status: Former     Packs/day: 1.00     Years: 14.00     Additional pack years: 0.00     Total pack years: 14.00     Types: Cigarettes     Quit date: 2017     Years since quittin.0     Passive exposure: Past    Smokeless tobacco: Former    Tobacco comments:     9/1/10   Vaping Use    Vaping Use: Never used   Substance Use Topics    Alcohol use: No     Alcohol/week: 0.0 standard drinks of alcohol     Comment: history of abuse 1995    Drug use: No         2020    11:47 AM 2022     7:20 AM 10/31/2023     7:42 AM   PHQ   PHQ-9 Total Score 0 0 0   Q9: Thoughts of better off dead/self-harm past 2 weeks Not at all Not at all Not at all         2017      8:33 AM 3/29/2019    11:12 AM 2020    11:47 AM   ALENA-7 SCORE   Total Score 0 0 1         10/31/2023     7:42 AM   Last PHQ-9   1.  Little interest or pleasure in doing things 0   2.  Feeling down, depressed, or hopeless 0   3.  Trouble falling or staying asleep, or sleeping too much 0   4.  Feeling tired or having little energy 0   5.  Poor appetite or overeating 0   6.  Feeling bad about yourself 0   7.  Trouble concentrating 0   8.  Moving slowly or restless 0   Q9: Thoughts of better off dead/self-harm past 2 weeks 0   PHQ-9 Total Score 0         2020    11:47 AM   ALENA-7    1. Feeling nervous, anxious, or on edge 0   2. Not being able to stop or control worrying 0   3. Worrying too much about different things 0   4. Trouble relaxing 0   5. Being so restless that it is hard to sit still 0   6. Becoming easily annoyed or irritable 1   7. Feeling afraid, as if something awful might happen 0   ALENA-7 Total Score 1   If you checked any problems, how difficult have they made it for you to do your work, take care of things at home, or get along with other people? Not difficult at all   Pt is doing well onenedelia Weiner MD    Suicide Assessment Five-step Evaluation and Treatment (SAFE-T)        Social History     Tobacco Use    Smoking status: Former     Packs/day: 1.00     Years: 14.00     Additional pack years: 0.00     Total pack years: 14.00     Types: Cigarettes     Quit date: 2017     Years since quittin.0     Passive exposure: Past    Smokeless tobacco: Former    Tobacco comments:     9/1/10   Substance Use Topics    Alcohol use: No     Alcohol/week: 0.0 standard drinks of alcohol     Comment: history of abuse 2024    11:28 AM   Alcohol Use   Prescreen: >3 drinks/day or >7 drinks/week? Not Applicable     Reviewed orders with patient.  Reviewed health maintenance and updated orders accordingly - Yes  Lab work is in process  Labs reviewed in EPIC  BP Readings from Last 3  Encounters:   24 120/79   24 121/69   10/31/23 (!) 150/86    Wt Readings from Last 3 Encounters:   24 96.6 kg (213 lb)   10/31/23 97.5 kg (215 lb)   22 98.4 kg (217 lb)                  Patient Active Problem List   Diagnosis    Hyperlipidemia LDL goal <160    Major depression in remission (H24)    Anxiety    Elevated blood pressure reading without diagnosis of hypertension    Alcohol dependence in remission (H)    Positive colorectal cancer screening using Cologuard test     Past Surgical History:   Procedure Laterality Date    ARTHROSCOPY KNEE RT/LT      right    HYSTERECTOMY, PAP NO LONGER INDICATED      Menorrhagia    HYSTERECTOMY, VAGINAL      partial. no cancer.    TONSILLECTOMY & ADENOIDECTOMY      [facility] NONSPECIFIC PROCEDURE      elbow fracture ORIF    [facility] NONSPECIFIC PROCEDURE  2002    cyst removal Left forearm       Social History     Tobacco Use    Smoking status: Former     Packs/day: 1.00     Years: 14.00     Additional pack years: 0.00     Total pack years: 14.00     Types: Cigarettes     Quit date: 2017     Years since quittin.0     Passive exposure: Past    Smokeless tobacco: Former    Tobacco comments:     9/1/10   Substance Use Topics    Alcohol use: No     Alcohol/week: 0.0 standard drinks of alcohol     Comment: history of abuse      Family History   Problem Relation Age of Onset    Hypertension Mother     Diabetes Mother     Depression Mother     Lipids Mother     Osteoporosis Mother     Hypertension Father     Diabetes Father     Lipids Father     Diabetes Maternal Grandmother     Cancer Paternal Grandfather         BONE    Allergies Son     Asthma Son     Allergies Daughter          Current Outpatient Medications   Medication Sig Dispense Refill    buPROPion (WELLBUTRIN SR) 100 MG 12 hr tablet Take 200 mg by mouth daily at 2 pm      BUSPAR 30 MG OR TABS 1 TABLET TWICE DAILY      MULTIVITAMIN TABS   OR 1 TABLET DAILY      PARoxetine (PAXIL) 30 MG  "tablet Take 2 tablets by mouth daily at 2 pm      simvastatin (ZOCOR) 20 MG tablet Take 1 tablet (20 mg) by mouth at bedtime 90 tablet 3     No Known Allergies  Recent Labs   Lab Test 22  0821 10/21/20  0659 19  0725 17  0832 16  0805   * 111* 120*   < > 118*   HDL 53 56 56   < > 48*   TRIG 142 138 112   < > 175*   ALT  --   --   --   --  37   TSH 1.21  --   --   --   --     < > = values in this interval not displayed.        Breast Cancer Screenin/7/2022     7:23 AM 2022     7:45 AM   Breast CA Risk Assessment (FHS-7)   Do you have a family history of breast, colon, or ovarian cancer? No / Unknown No / Unknown         Pt had mammogram Today  Pertinent mammograms are reviewed under the imaging tab.    History of abnormal Pap smear: Status post hysterectomy. Pap still indicated. no      1/3/2011    12:00 AM 6/3/2009    12:00 AM   PAP / HPV   PAP (Historical) NIL  NIL      Reviewed and updated as needed this visit by clinical staff   Tobacco  Allergies  Meds              Reviewed and updated as needed this visit by Provider                  Past Medical History:   Diagnosis Date    Alcoholic (H)     recovering sober since     Anxiety     sees NP psychiatry in [place].    Hyperlipidemia LDL goal <160      (normal spontaneous vaginal delivery)       Past Surgical History:   Procedure Laterality Date    ARTHROSCOPY KNEE RT/LT      right    HYSTERECTOMY, PAP NO LONGER INDICATED      Menorrhagia    HYSTERECTOMY, VAGINAL      partial. no cancer.    TONSILLECTOMY & ADENOIDECTOMY      [facility] NONSPECIFIC PROCEDURE      elbow fracture ORIF    [facility] NONSPECIFIC PROCEDURE  2002    cyst removal Left forearm       OBJECTIVE:   BP (!) 150/83   Pulse 78   Temp 97.6  F (36.4  C) (Tympanic)   Resp 16   Ht 1.686 m (5' 6.38\")   Wt 96.6 kg (213 lb)   LMP 2010 (Exact Date)   SpO2 97%   BMI 33.99 kg/m     Estimated body mass index is 33.99 kg/m  as " "calculated from the following:    Height as of this encounter: 1.686 m (5' 6.38\").    Weight as of this encounter: 96.6 kg (213 lb).  Review of Systems   Constitutional:  Negative for chills and fever.   HENT:  Negative for congestion, ear pain, hearing loss and sore throat.    Eyes:  Negative for pain and visual disturbance.   Respiratory:  Negative for cough and shortness of breath.    Cardiovascular:  Negative for chest pain and palpitations.   Gastrointestinal:  Negative for abdominal pain, constipation, diarrhea and nausea.   Genitourinary:  Negative for dysuria, frequency, genital sores, hematuria, pelvic pain, urgency, vaginal bleeding and vaginal discharge.   Musculoskeletal:  Negative for arthralgias, joint swelling and myalgias.   Skin:  Negative for rash.   Neurological:  Negative for dizziness, weakness and headaches.   Psychiatric/Behavioral:  The patient is not nervous/anxious.        Review of Systems  CONSTITUTIONAL: NEGATIVE for fever, chills, change in weight  INTEGUMENTARY/SKIN: NEGATIVE for worrisome rashes, moles or lesions  EYES: NEGATIVE for vision changes or irritation  ENT/MOUTH: NEGATIVE for ear, mouth and throat problems  RESP: NEGATIVE for significant cough or SOB  BREAST: NEGATIVE for masses, tenderness or discharge  CV: NEGATIVE for chest pain, palpitations or peripheral edema  GI: NEGATIVE for nausea, abdominal pain, heartburn, or change in bowel habits  : NEGATIVE for frequency, dysuria, or hematuria  MUSCULOSKELETAL: NEGATIVE for significant arthralgias or myalgia  NEURO: NEGATIVE for weakness, dizziness or paresthesias  ENDOCRINE: NEGATIVE for temperature intolerance, skin/hair changes  HEME: NEGATIVE for bleeding problems  PSYCHIATRIC: NEGATIVE for changes in mood or affect  Physical Exam  /79   Pulse 78   Temp 97.6  F (36.4  C) (Tympanic)   Resp 16   Ht 1.686 m (5' 6.38\")   Wt 96.6 kg (213 lb)   LMP 12/20/2010 (Exact Date)   SpO2 97%   BMI 33.99 kg/m      GENERAL: " alert and no distress  EYES: Eyes grossly normal to inspection, PERRL and conjunctivae and sclerae normal  HENT: ear canals and TM's normal, nose and mouth without ulcers or lesions  NECK: no adenopathy, no asymmetry, masses, or scars  RESP: lungs clear to auscultation - no rales, rhonchi or wheezes  CV: regular rate and rhythm, normal S1 S2, no S3 or S4, no murmur, click or rub, no peripheral edema  ABDOMEN: soft, nontender, no hepatosplenomegaly, no masses and bowel sounds normal  MS: no gross musculoskeletal defects noted, no edema  SKIN: no suspicious lesions or rashes  NEURO: Normal strength and tone, mentation intact and speech normal  PSYCH: mentation appears normal, affect normal/bright  LYMPH: no cervical, supraclavicular, axillary, or inguinal adenopathy    Diagnostic Test Results:  Labs reviewed in Epic    ASSESSMENT/PLAN:   Routine history and physical examination of adult      Hyperlipidemia LDL goal <160  Pending   - Lipid panel reflex to direct LDL Non-fasting; Future  - simvastatin (ZOCOR) 20 MG tablet; Take 1 tablet (20 mg) by mouth at bedtime    Major depression in remission (H24)  Stable     Alcohol dependence in remission (H)      Anxiety  Stable     IFG (impaired fasting glucose)  Pending   - Glucose; Future  - Hemoglobin A1c; Future    Snoring  And ? Stops Breathing at Night  Discussed weight loss will help   - Adult Sleep Eval & Management  Referral; Future  Discussed Diet and Exercise          Counseling  Reviewed preventive health counseling, as reflected in patient instructions       Regular exercise       Healthy diet/nutrition       Vision screening       Hearing screening       Osteoporosis prevention/bone health       The 10-year ASCVD risk score (Johnathan GABRIEL, et al., 2019) is: 2.9%    Values used to calculate the score:      Age: 59 years      Sex: Female      Is Non- : No      Diabetic: No      Tobacco smoker: No      Systolic Blood Pressure: 120 mmHg     "  Is BP treated: No      HDL Cholesterol: 53 mg/dL      Total Cholesterol: 189 mg/dL       Advance Care Planning      BMI  Estimated body mass index is 33.99 kg/m  as calculated from the following:    Height as of this encounter: 1.686 m (5' 6.38\").    Weight as of this encounter: 96.6 kg (213 lb).   Weight management plan: Discussed healthy diet and exercise guidelines      She reports that she quit smoking about 7 years ago. Her smoking use included cigarettes. She has a 14 pack-year smoking history. She has been exposed to tobacco smoke. She has quit using smokeless tobacco.        Signed Electronically by: Danika Hammond MD  "

## 2024-01-17 NOTE — LETTER
January 19, 2024    Yessica Regalado  [address] [facility] [address]          Dear ,    We are writing to inform you of your test results.  Cholesterol is Good   Blood sugars are in Prediabetic range   Please see Diabetic educator to discuss diet   Follow up 6 month   Diet and Exercise is Important to Prevent Diabetes   Sincerely,   Danika Hammond MD     Decrease carbohydrates   Carbohydrates are milks, fruits, starches (such as bread, cereal, potatoes and pasta); peas and corn; and sweets and desserts. Eat these foods in moderation.   Tips for health:   - eat balanced meals with fruits, vegetables, starches, meats and milk products   - limit foods high in calories like cake, candy, cookies, pie and regular soda   - choose sugar-free beverages   - build a plate that is one-half vegetables, one-quarter starch, and one-quarter meat or other protein source   - consider fruit for dessert   - choose foods that have fiber, such as whole grains   - eat healthful fats such as olive oil or canola oil         Resulted Orders   Lipid panel reflex to direct LDL Non-fasting   Result Value Ref Range    Cholesterol 133 <200 mg/dL    Triglycerides 125 <150 mg/dL    Direct Measure HDL 37 (L) >=50 mg/dL    LDL Cholesterol Calculated 71 <=100 mg/dL    Non HDL Cholesterol 96 <130 mg/dL    Patient Fasting > 8hrs? Unknown     Narrative    Cholesterol  Desirable:  <200 mg/dL    Triglycerides  Normal:  Less than 150 mg/dL  Borderline High:  150-199 mg/dL  High:  200-499 mg/dL  Very High:  Greater than or equal to 500 mg/dL    Direct Measure HDL  Female:  Greater than or equal to 50 mg/dL   Male:  Greater than or equal to 40 mg/dL    LDL Cholesterol  Desirable:  <100mg/dL  Above Desirable:  100-129 mg/dL   Borderline High:  130-159 mg/dL   High:  160-189 mg/dL   Very High:  >= 190 mg/dL    Non HDL Cholesterol  Desirable:  130 mg/dL  Above Desirable:  130-159 mg/dL  Borderline High:  160-189 mg/dL  High:  190-219 mg/dL  Very  High:  Greater than or equal to 220 mg/dL   Glucose   Result Value Ref Range    Glucose 94 70 - 99 mg/dL    Patient Fasting > 8hrs? Unknown    Hemoglobin A1c   Result Value Ref Range    Hemoglobin A1C 6.1 (H) 0.0 - 5.6 %      Comment:      Normal <5.7%   Prediabetes 5.7-6.4%    Diabetes 6.5% or higher     Note: Adopted from ADA consensus guidelines.       If you have any questions or concerns, please call the clinic at the number listed above.       Sincerely,      Danika Hammond MD

## 2024-01-18 LAB
PATH REPORT.COMMENTS IMP SPEC: NORMAL
PATH REPORT.COMMENTS IMP SPEC: NORMAL
PATH REPORT.FINAL DX SPEC: NORMAL
PATH REPORT.GROSS SPEC: NORMAL
PATH REPORT.MICROSCOPIC SPEC OTHER STN: NORMAL
PATH REPORT.RELEVANT HX SPEC: NORMAL
PHOTO IMAGE: NORMAL

## 2024-01-19 LAB
CHOLEST SERPL-MCNC: 133 MG/DL
FASTING STATUS PATIENT QL REPORTED: ABNORMAL
FASTING STATUS PATIENT QL REPORTED: NORMAL
GLUCOSE SERPL-MCNC: 94 MG/DL (ref 70–99)
HDLC SERPL-MCNC: 37 MG/DL
LDLC SERPL CALC-MCNC: 71 MG/DL
NONHDLC SERPL-MCNC: 96 MG/DL
TRIGL SERPL-MCNC: 125 MG/DL

## 2024-11-11 ENCOUNTER — VIRTUAL VISIT (OUTPATIENT)
Dept: FAMILY MEDICINE | Facility: CLINIC | Age: 59
End: 2024-11-11
Payer: COMMERCIAL

## 2024-11-11 DIAGNOSIS — J06.9 URI WITH COUGH AND CONGESTION: Primary | ICD-10-CM

## 2024-11-11 PROCEDURE — 99441 PR PHYSICIAN TELEPHONE EVALUATION 5-10 MIN: CPT | Mod: 93 | Performed by: FAMILY MEDICINE

## 2024-11-11 RX ORDER — BENZONATATE 100 MG/1
100 CAPSULE ORAL 3 TIMES DAILY PRN
Qty: 30 CAPSULE | Refills: 1 | Status: SHIPPED | OUTPATIENT
Start: 2024-11-11

## 2024-11-11 NOTE — PROGRESS NOTES
Yessica is a 59 year old who is being evaluated via a billable telephone visit.    What phone number would you like to be contacted at? [phone]  How would you like to obtain your AVS? Lynn  Originating Location (pt. Location): Home    Distant Location (provider location):  Off-site    Assessment & Plan     URI with cough and congestion    - benzonatate (TESSALON) 100 MG capsule; Take 1 capsule (100 mg) by mouth 3 times daily as needed for cough.      Patient Instructions   Sinus pressure is primarily a problem of drainage.  You can best help your body clear the sinus secretions and pressure by opening up the natural passageways which are often blocked by viral colds and allergies.      Short courses of a nasal decongestant spray (Afrin or Neosinephrine) are one of the most effective tools in opening sinus drainage passageways.  Their use should be restricted to 3 days though due to the high risk of nasal addiction.  Pseudoephedrine or phenylephrine (Sudafed) is often helpful but it can cause elevations in blood pressure and insomnia.     Sometimes a nasal saline spray will help rinse out the nasal passages and feel good.     Guaifenesin (Robitussin or Mucinex) helps loosen secretions and often help make the mucous more liquid and easier to clear.    For pain and fevers, acetaminophen (Tylenol) is most appropriate.  Ibuprofen (Advil) or naproxen (Aleve) are useful too and last longer but they can cause elevation of blood pressure or stomach problems.    Antihistamines (Benadryl, Dimetapp, etc.) cause sedation, confusion, bowel and urinary abnormalities and are of little use for infectious causes of cough and nasal congestion.  Their use should be reserved for allergic symptoms.    The body needs to be treated well in order to help heal itself.  Rest as needed.  It is ok to reduce food intake if appetite is poor but it is quite important to maintain/increase fluid intake.  Sinus pressure and infections usually go  "away on their own with appropriate care.  If symptoms worsen or persist beyond 10 days, an antibiotic might be worth trying to treat a possible bacterial component.        BMI  Estimated body mass index is 33.99 kg/m  as calculated from the following:    Height as of 1/17/24: 1.686 m (5' 6.38\").    Weight as of 1/17/24: 96.6 kg (213 lb).         Follow up in 5 days if not improving    Subjective   Yessica is a 59 year old, presenting for the following health issues:  Sinus Problem      HPI              Symptoms: cc Present Absent Comment   Fever/Chills   x    Fatigue  x     Headache   x    Muscle or Body  Aches   x    Eye Irritation   x    Sneezing   x    Nasal Juice/Drg x      Sinus Pressure/Pain x      Dental pain   x    Sore Throat  x  With coughing   Swollen Glands   x    Ear Pain/Fullness   x    Cough x   Deep cough   Wheeze   x    Chest Discomfort   x    Shortness of breath   x    Abdominal pain   x    Emesis    x    Diarrhea   x    Other   x      Symptom duration:  1.5 week prior had a horse voice, increasing last Wednesday.    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 Cough not too bad at night and she is able to sleep           Objective           Vitals:  No vitals were obtained today due to virtual visit.    Physical Exam   General: Alert and no distress //Respiratory: No audible wheeze, cough, or shortness of breath // Psychiatric:  Appropriate affect, tone, and pace of words            Phone call duration: 10 minutes  Signed Electronically by: Marysol Rios MD    "

## 2024-12-18 ENCOUNTER — PATIENT OUTREACH (OUTPATIENT)
Dept: CARE COORDINATION | Facility: CLINIC | Age: 59
End: 2024-12-18
Payer: COMMERCIAL

## 2025-01-01 ENCOUNTER — PATIENT OUTREACH (OUTPATIENT)
Dept: CARE COORDINATION | Facility: CLINIC | Age: 60
End: 2025-01-01
Payer: COMMERCIAL

## 2025-01-15 ENCOUNTER — PATIENT OUTREACH (OUTPATIENT)
Dept: CARE COORDINATION | Facility: CLINIC | Age: 60
End: 2025-01-15
Payer: COMMERCIAL

## 2025-03-04 ENCOUNTER — ANCILLARY PROCEDURE (OUTPATIENT)
Dept: MAMMOGRAPHY | Facility: CLINIC | Age: 60
End: 2025-03-04
Payer: COMMERCIAL

## 2025-03-04 ENCOUNTER — OFFICE VISIT (OUTPATIENT)
Dept: FAMILY MEDICINE | Facility: CLINIC | Age: 60
End: 2025-03-04
Payer: COMMERCIAL

## 2025-03-04 VITALS
HEART RATE: 74 BPM | SYSTOLIC BLOOD PRESSURE: 154 MMHG | TEMPERATURE: 98.5 F | HEIGHT: 66 IN | WEIGHT: 219 LBS | BODY MASS INDEX: 35.2 KG/M2 | OXYGEN SATURATION: 97 % | RESPIRATION RATE: 14 BRPM | DIASTOLIC BLOOD PRESSURE: 87 MMHG

## 2025-03-04 DIAGNOSIS — F32.5 MAJOR DEPRESSION IN REMISSION: ICD-10-CM

## 2025-03-04 DIAGNOSIS — E66.812 CLASS 2 SEVERE OBESITY DUE TO EXCESS CALORIES WITH SERIOUS COMORBIDITY AND BODY MASS INDEX (BMI) OF 35.0 TO 35.9 IN ADULT (H): ICD-10-CM

## 2025-03-04 DIAGNOSIS — R73.03 PREDIABETES: ICD-10-CM

## 2025-03-04 DIAGNOSIS — D12.6 TUBULAR ADENOMA OF COLON: ICD-10-CM

## 2025-03-04 DIAGNOSIS — F10.21 ALCOHOL DEPENDENCE IN REMISSION (H): ICD-10-CM

## 2025-03-04 DIAGNOSIS — Z00.00 ROUTINE GENERAL MEDICAL EXAMINATION AT A HEALTH CARE FACILITY: Primary | ICD-10-CM

## 2025-03-04 DIAGNOSIS — R06.83 SNORING: ICD-10-CM

## 2025-03-04 DIAGNOSIS — F41.9 ANXIETY: ICD-10-CM

## 2025-03-04 DIAGNOSIS — Z12.31 VISIT FOR SCREENING MAMMOGRAM: ICD-10-CM

## 2025-03-04 DIAGNOSIS — E78.5 HYPERLIPIDEMIA LDL GOAL <160: ICD-10-CM

## 2025-03-04 DIAGNOSIS — E66.01 CLASS 2 SEVERE OBESITY DUE TO EXCESS CALORIES WITH SERIOUS COMORBIDITY AND BODY MASS INDEX (BMI) OF 35.0 TO 35.9 IN ADULT (H): ICD-10-CM

## 2025-03-04 DIAGNOSIS — I10 HYPERTENSION GOAL BP (BLOOD PRESSURE) < 140/90: ICD-10-CM

## 2025-03-04 LAB
EST. AVERAGE GLUCOSE BLD GHB EST-MCNC: 114 MG/DL
HBA1C MFR BLD: 5.6 % (ref 0–5.6)

## 2025-03-04 PROCEDURE — 77063 BREAST TOMOSYNTHESIS BI: CPT | Mod: TC | Performed by: RADIOLOGY

## 2025-03-04 PROCEDURE — 36415 COLL VENOUS BLD VENIPUNCTURE: CPT | Performed by: FAMILY MEDICINE

## 2025-03-04 PROCEDURE — 83036 HEMOGLOBIN GLYCOSYLATED A1C: CPT | Performed by: FAMILY MEDICINE

## 2025-03-04 PROCEDURE — 77067 SCR MAMMO BI INCL CAD: CPT | Mod: TC | Performed by: RADIOLOGY

## 2025-03-04 RX ORDER — SIMVASTATIN 20 MG
20 TABLET ORAL AT BEDTIME
Qty: 90 TABLET | Refills: 3 | Status: SHIPPED | OUTPATIENT
Start: 2025-03-04 | End: 2025-03-06

## 2025-03-04 RX ORDER — LOSARTAN POTASSIUM 25 MG/1
25 TABLET ORAL DAILY
Qty: 30 TABLET | Refills: 0 | Status: SHIPPED | OUTPATIENT
Start: 2025-03-04

## 2025-03-04 SDOH — HEALTH STABILITY: PHYSICAL HEALTH: ON AVERAGE, HOW MANY MINUTES DO YOU ENGAGE IN EXERCISE AT THIS LEVEL?: 20 MIN

## 2025-03-04 SDOH — HEALTH STABILITY: PHYSICAL HEALTH: ON AVERAGE, HOW MANY DAYS PER WEEK DO YOU ENGAGE IN MODERATE TO STRENUOUS EXERCISE (LIKE A BRISK WALK)?: 3 DAYS

## 2025-03-04 ASSESSMENT — SOCIAL DETERMINANTS OF HEALTH (SDOH): HOW OFTEN DO YOU GET TOGETHER WITH FRIENDS OR RELATIVES?: TWICE A WEEK

## 2025-03-04 ASSESSMENT — PATIENT HEALTH QUESTIONNAIRE - PHQ9
SUM OF ALL RESPONSES TO PHQ QUESTIONS 1-9: 0
SUM OF ALL RESPONSES TO PHQ QUESTIONS 1-9: 0

## 2025-03-04 ASSESSMENT — PAIN SCALES - GENERAL: PAINLEVEL_OUTOF10: NO PAIN (0)

## 2025-03-04 NOTE — PATIENT INSTRUCTIONS
[facility] Diabetes:  http://www.fpanetwork.org/diabetes        Patient Education   Preventive Care Advice   This is general advice given by our system to help you stay healthy. However, your care team may have specific advice just for you. Please talk to your care team about your preventive care needs.  Nutrition  Eat 5 or more servings of fruits and vegetables each day.  Try wheat bread, brown rice and whole grain pasta (instead of white bread, rice, and pasta).  Get enough calcium and vitamin D. Check the label on foods and aim for 100% of the RDA (recommended daily allowance).  Lifestyle  Exercise at least 150 minutes each week  (30 minutes a day, 5 days a week).  Do muscle strengthening activities 2 days a week. These help control your weight and prevent disease.  No smoking.  Wear sunscreen to prevent skin cancer.  Have a dental exam and cleaning every 6 months.  Yearly exams  See your health care team every year to talk about:  Any changes in your health.  Any medicines your care team has prescribed.  Preventive care, family planning, and ways to prevent chronic diseases.  Shots (vaccines)   HPV shots (up to age 26), if you've never had them before.  Hepatitis B shots (up to age 59), if you've never had them before.  COVID-19 shot: Get this shot when it's due.  Flu shot: Get a flu shot every year.  Tetanus shot: Get a tetanus shot every 10 years.  Pneumococcal, hepatitis A, and RSV shots: Ask your care team if you need these based on your risk.  Shingles shot (for age 50 and up)  General health tests  Diabetes screening:  Starting at age 35, Get screened for diabetes at least every 3 years.  If you are younger than age 35, ask your care team if you should be screened for diabetes.  Cholesterol test: At age 39, start having a cholesterol test every 5 years, or more often if advised.  Bone density scan (DEXA): At age 50, ask your care team if you should have this scan for osteoporosis (brittle bones).  Hepatitis C:  Get tested at least once in your life.  STIs (sexually transmitted infections)  Before age 24: Ask your care team if you should be screened for STIs.  After age 24: Get screened for STIs if you're at risk. You are at risk for STIs (including HIV) if:  You are sexually active with more than one person.  You don't use condoms every time.  You or a partner was diagnosed with a sexually transmitted infection.  If you are at risk for HIV, ask about PrEP medicine to prevent HIV.  Get tested for HIV at least once in your life, whether you are at risk for HIV or not.  Cancer screening tests  Cervical cancer screening: If you have a cervix, begin getting regular cervical cancer screening tests starting at age 21.  Breast cancer scan (mammogram): If you've ever had breasts, begin having regular mammograms starting at age 40. This is a scan to check for breast cancer.  Colon cancer screening: It is important to start screening for colon cancer at age 45.  Have a colonoscopy test every 10 years (or more often if you're at risk) Or, ask your provider about stool tests like a FIT test every year or Cologuard test every 3 years.  To learn more about your testing options, visit:   .  For help making a decision, visit:   https://bit.ly/pl46127.  Prostate cancer screening test: If you have a prostate, ask your care team if a prostate cancer screening test (PSA) at age 55 is right for you.  Lung cancer screening: If you are a current or former smoker ages 50 to 80, ask your care team if ongoing lung cancer screenings are right for you.  For informational purposes only. Not to replace the advice of your health care provider. Copyright   2023 [employer]. All rights reserved. Clinically reviewed by the [facility]. [employer] 544741 - REV 01/24.   Learning About Risk for Heart Attack and Stroke (01:56)  Your health professional recommends that you watch this short online health video.  Learn what  raises your risk for having a heart attack or stroke and how you can lower your risk.   Purpose: Explains risk factors for heart attack and stroke and ways to lower the risk.  Goal: Users will understand what it means to be at risk for having a heart attack or stroke and what they can do to reduce their risk.    Watch: Scan the QR code or visit the link to view video       https://hwi.[email or web address]  Current as of: July 31, 2024  Content Version: 14.3    2024 [employer].   Care instructions adapted under license by your healthcare professional. If you have questions about a medical condition or this instruction, always ask your healthcare professional. [employer] disclaims any warranty or liability for your use of this information.    Eating Healthy Foods: Care Instructions  With every meal, you can make healthy food choices. Try to eat a variety of fruits, vegetables, whole grains, lean proteins, and low-fat dairy products. This can help you get the right balance of nutrients, including vitamins and minerals. Small changes add up over time. You can start by adding one healthy food to your meals each day.    Try to make half your plate fruits and vegetables, one-fourth whole grains, and one-fourth lean proteins. Try including dairy with your meals.   Eat more fruits and vegetables. Try to have them with most meals and snacks.   Foods for healthy eating        Fruits   These can be fresh, frozen, canned, or dried.  Try to choose whole fruit rather than fruit juice.  Eat a variety of colors.        Vegetables   These can be fresh, frozen, canned, or dried.  Beans, peas, and lentils count too.        Whole grains   Choose whole-grain breads, cereals, and noodles.  Try brown rice.        Lean proteins   These can include lean meat, poultry, fish, and eggs.  You can also have tofu, beans, peas, lentils, nuts, and seeds.        Dairy   Try milk, yogurt, and cheese.  Choose low-fat or fat-free  "when you can.  If you need to, use lactose-free milk or fortified plant-based milk products, such as soy milk.        Water   Drink water when you're thirsty.  Limit sugar-sweetened drinks, including soda, fruit drinks, and sports drinks.  Where can you learn more?  Go to https://www.[employer].net/patiented  Enter T756 in the search box to learn more about \"Eating Healthy Foods: Care Instructions.\"  Current as of: September 20, 2023  Content Version: 14.3    2024 [employer].   Care instructions adapted under license by your healthcare professional. If you have questions about a medical condition or this instruction, always ask your healthcare professional. [employer] disclaims any warranty or liability for your use of this information.    Learning About Being Physically Active  What is physical activity?     Being physically active means doing any kind of activity that gets your body moving.  The types of physical activity that can help you get fit and stay healthy include:  Aerobic or \"cardio\" activities. These make your heart beat faster and make you breathe harder, such as brisk walking, riding a bike, or running. They strengthen your heart and lungs and build up your endurance.  Strength training activities. These make your muscles work against, or \"resist,\" something. Examples include lifting weights or doing push-ups. These activities help tone and strengthen your muscles and bones.  Stretches. These let you move your joints and muscles through their full range of motion. Stretching helps you be more flexible.  Reaching a balance between these three types of physical activity is important because each one contributes to your overall fitness.  What are the benefits of being active?  Being active is one of the best things you can do for your health. It helps you to:  Feel stronger and have more energy to do all the things you like to do.  Focus better at school or work.  Feel, think, and " "sleep better.  Reach and stay at a healthy weight.  Lose fat and build lean muscle.  Lower your risk for serious health problems, including diabetes, heart attack, high blood pressure, and some cancers.  Keep your heart, lungs, bones, muscles, and joints strong and healthy.  How can you make being active part of your life?  Start slowly. Make it your long-term goal to get at least 30 minutes of exercise on most days of the week. Walking is a good choice. You also may want to do other activities, such as running, swimming, cycling, or playing tennis or team sports.  Pick activities that you like--ones that make your heart beat faster, your muscles stronger, and your muscles and joints more flexible. If you find more than one thing you like doing, do them all. You don't have to do the same thing every day.  Get your heart pumping every day. Any activity that makes your heart beat faster and keeps it at that rate for a while counts.  Here are some great ways to get your heart beating faster:  Go for a brisk walk, run, or hike.  Go for a swim or bike ride.  Take an online exercise class or dance.  Play a game of touch football, basketball, or soccer.  Play tennis, pickleball, or racquetball.  Climb stairs.  Even some household chores can be aerobic. Just do them at a faster pace. Raking or mowing the lawn, sweeping the garage, and vacuuming and cleaning your home all can help get your heart rate up.  Strengthen your muscles during the week. You don't have to lift heavy weights or grow big, bulky muscles to get stronger. Doing a few simple activities that make your muscles work against, or \"resist,\" something can help you get stronger. Aim for at least twice a week.  For example, you can:  Do push-ups or sit-ups, which use your own body weight as resistance.  Lift weights or dumbbells or use stretch bands at home or in a gym or community center.  Stretch your muscles often. Stretching will help you as you become more " "active. It can help you stay flexible and loosen tight muscles. It can also help improve your balance and posture and can be a great way to relax.  Be sure to stretch the muscles you'll be using when you work out. It's best to warm your muscles slightly before you stretch them. Walk or do some other light aerobic activity for a few minutes. Then start stretching.  When you stretch your muscles:  Do it slowly. Stretching is not about going fast or making sudden movements.  Don't push or bounce during a stretch.  Hold each stretch for at least 15 to 30 seconds, if you can. You should feel a stretch in the muscle, but not pain.  Breathe out as you do the stretch. Then breathe in as you hold the stretch. Don't hold your breath.  If you're worried about how more activity might affect your health, have a checkup before you start. Follow any special advice your doctor gives you for getting a smart start.  Where can you learn more?  Go to https://www.[employer].net/patiented  Enter W332 in the search box to learn more about \"Learning About Being Physically Active.\"  Current as of: July 31, 2024  Content Version: 14.3    2024 [employer].   Care instructions adapted under license by your healthcare professional. If you have questions about a medical condition or this instruction, always ask your healthcare professional. [employer] disclaims any warranty or liability for your use of this information.     Lifestyle Modifications to Manage Hypertension    Weight reduction to a body mass index of 18.5 to 24.9 will give rise to a systolic blood pressure reduction of  5 to 20 mm Hg.  Your current BMI is bowel movement.    Incorporating the DASH Diet (a diet rich in fruits, vegetables, and low-fat dairy products with a reduced content of saturated and total fat) is as effective as a single drug agent.  This will provide a reduction of  8 to 14 mm Hg    Dietary sodium restriction to no more than 100 mmol per " day (2.4 g sodium or 6 g sodium chloride) will provide a reduction of  2 to 8 mm Hg.    Engage in regular aerobic activity such as brisk walking (at least 30 minutes per day, most days of the week). This will provide a reduction of  4 to 9 mm Hg.    Limit alcohol consumption to no more than two drinks (1 oz or 30 mL of alcohol; e.g., 24-oz beer, 10 oz of wine, or 3 oz of 80-proof whiskey) per day in most men and to no more than one drink per day in women and lighter-weight persons. This will provide a reduction of  2 to 4 mm Hg.    If continues to be elevated, we should consider using a low dose of a medication to improve this and lower your risk for heart attack and stroke.

## 2025-03-04 NOTE — PROGRESS NOTES
Preventive Care Visit  [facility] MARY ALICE Hammond MD, Family Medicine  Mar 4, 2025      Assessment & Plan     Routine general medical examination at a health care facility  =    Hyperlipidemia LDL goal <160  Stable   - Lipid panel reflex to direct LDL Non-fasting; Future  - simvastatin (ZOCOR) 20 MG tablet; Take 1 tablet (20 mg) by mouth at bedtime.  - Lipid panel reflex to direct LDL Fasting; Future  - ALT; Future  - Comprehensive metabolic panel (BMP + Alb, Alk Phos, ALT, AST, Total. Bili, TP); Future  - Lipid panel reflex to direct LDL Non-fasting  - Comprehensive metabolic panel (BMP + Alb, Alk Phos, ALT, AST, Total. Bili, TP)    Anxiety  Stable     Major depression in remission  In remission-see PHQ9    Tubular adenoma of colon  UTD with colon ca screen     Class 2 severe obesity due to excess calories with serious comorbidity and body mass index (BMI) of 35.0 to 35.9 in adult (H)  Discussed needs to lose weight to decrease ASCVD risk  - semaglutide-weight management (WEGOVY) 0.25 MG/0.5ML pen; Inject 0.5 mLs (0.25 mg) subcutaneously once a week.    Prediabetes  As above  Advised see Diabetic ed   - Hemoglobin A1c; Future  - Glucose; Future  - Comprehensive metabolic panel (BMP + Alb, Alk Phos, ALT, AST, Total. Bili, TP); Future  - Adult Diabetes Education  Referral; Future  - Hemoglobin A1c  - Glucose  - Comprehensive metabolic panel (BMP + Alb, Alk Phos, ALT, AST, Total. Bili, TP)  Discussed side effects of wegove  If any abdominal pain, nausea, vomiting, Lump in neck, visual symptoms-stop medicines and call me  Discussed Risk of pancreatitis, Thyroid cancer, visual symptoms etc  Follow up 1 month  If Insurance does not cover call me and we can start Metformin  Read all handouts from your pharmacist  Follow up 1 month  You need to watch diet  This was  discussed   Snoring  ? She says her family tells her she gasps for Breath at Night   Advised sleep study  Wt loss will help   -  "Adult Sleep Eval & Management  Referral; Future  - Comprehensive metabolic panel (BMP + Alb, Alk Phos, ALT, AST, Total. Bili, TP); Future  - Comprehensive metabolic panel (BMP + Alb, Alk Phos, ALT, AST, Total. Bili, TP)    Hypertension goal BP (blood pressure) < 140/90  High  SEE [facility] care orders  The potential side effects of this medication have been discussed with the patient.  Call if any significant problems with these are experienced.  DASH diet   - losartan (COZAAR) 25 MG tablet; Take 1 tablet (25 mg) by mouth daily.  Alcohol use is in remission  Follow up 1 month        BMI  Estimated body mass index is 35.32 kg/m  as calculated from the following:    Height as of this encounter: 1.677 m (5' 6.02\").    Weight as of this encounter: 99.3 kg (219 lb).   Weight management plan: Discussed healthy diet and exercise guidelines    Counseling  Appropriate preventive services were addressed with this patient via screening, questionnaire, or discussion as appropriate for fall prevention, nutrition, physical activity, Tobacco-use cessation, social engagement, weight loss and cognition.  Checklist reviewing preventive services available has been given to the patient.  Reviewed patient's diet, addressing concerns and/or questions.   She is at risk for lack of exercise and has been provided with information to increase physical activity for the benefit of her well-being.       The longitudinal plan of care for the diagnosis(es)/condition(s) as documented were addressed during this visit. Due to the added complexity in care, I will continue to support Yessica in the subsequent management and with ongoing continuity of care.    Joe Juan is a 60 year old, presenting for the following:  Physical        3/4/2025     4:45 PM   Additional Questions   Roomed by Carli CORADO  Here for a Physical and check on medical issues     Hyperlipidemia Follow-Up    Are you regularly taking any medication or supplement to " lower your cholesterol?   Yes- statins  Are you having muscle aches or other side effects that you think could be caused by your cholesterol lowering medication?  No  Patient presents for evaluation of hypertension.  She indicates that she is feeling well and denies any symptoms referable to her elevated blood pressure. Specifically denies chest pain, palpitations, dyspnea, orthopnea, PND or peripheral edema. Current medication regimen is as listed below. Patient denies any side effects of medication.    Family history: positive for hypertension and diabetes mellitus  Age at onset of elevated blood pressure: severl months now  Cardiovascular risk factors: family history, hypertension, and obesity  Use of agents associated with hypertension: none  History of renal disease: negative      Advance Care Planning  Patient does not have a Health Care Directive: Discussed advance care planning with patient; information given to patient to review.      3/4/2025   General Health   How would you rate your overall physical health? Good   Feel stress (tense, anxious, or unable to sleep) Not at all         3/4/2025   Nutrition   Three or more servings of calcium each day? Yes   Diet: Regular (no restrictions)   How many servings of fruit and vegetables per day? (!) 2-3   How many sweetened beverages each day? 0-1         3/4/2025   Exercise   Days per week of moderate/strenous exercise 3 days   Average minutes spent exercising at this level 20 min         3/4/2025   Social Factors   Frequency of gathering with friends or relatives Twice a week   Worry food won't last until get money to buy more No   Food not last or not have enough money for food? No   Do you have housing? (Housing is defined as stable permanent housing and does not include staying ouside in a car, in a tent, in an abandoned building, in an overnight shelter, or couch-surfing.) Yes   Are you worried about losing your housing? No   Lack of transportation? No    Unable to get utilities (heat,electricity)? No         3/4/2025   Fall Risk   Fallen 2 or more times in the past year? No   Trouble with walking or balance? No          3/4/2025   Dental   Dentist two times every year? Yes          Today's PHQ-9 Score:       3/4/2025     4:26 PM   PHQ-9 SCORE   PHQ-9 Total Score MyChart 0   PHQ-9 Total Score 0        Patient-reported         3/4/2025   Substance Use   Alcohol more than 3/day or more than 7/wk Not Applicable   Do you use any other substances recreationally? No     Social History     Tobacco Use    Smoking status: Former     Current packs/day: 0.00     Average packs/day: 1 pack/day for 14.0 years (14.0 ttl pk-yrs)     Types: Cigarettes     Start date: 2003     Quit date: 2017     Years since quittin.1     Passive exposure: Past    Smokeless tobacco: Former    Tobacco comments:     9/1/10   Vaping Use    Vaping status: Never Used   Substance Use Topics    Alcohol use: No     Alcohol/week: 0.0 standard drinks of alcohol     Comment: history of abuse     Drug use: No           3/4/2025   LAST FHS-7 RESULTS   1st degree relative breast or ovarian cancer No   Any relative bilateral breast cancer No   Any male have breast cancer No   Any ONE woman have BOTH breast AND ovarian cancer No   Any woman with breast cancer before 50yrs No   2 or more relatives with breast AND/OR ovarian cancer No   2 or more relatives with breast AND/OR bowel cancer No        Pt gets her mammograms        3/4/2025   STI Screening   New sexual partner(s) since last STI/HIV test? No     History of abnormal Pap smear: Status post hysterectomy. Pap still indicated. no        1/3/2011    12:00 AM 6/3/2009    12:00 AM   PAP / HPV   PAP (Historical) NIL  NIL      ASCVD Risk   The 10-year ASCVD risk score (Johnathan GABRIEL, et al., 2019) is: 6.7%    Values used to calculate the score:      Age: 60 years      Sex: Female      Is Non- : No      Diabetic: No       Tobacco smoker: No      Systolic Blood Pressure: 154 mmHg      Is BP treated: Yes      HDL Cholesterol: 37 mg/dL      Total Cholesterol: 133 mg/dL    Fracture Risk Assessment Tool  Link to Frax Calculator  Use the information below to complete the Frax calculator  : 1965  Sex: female  Weight (kg): 99.3 kg (actual weight)  Height (cm): 167.7 cm  Previous Fragility Fracture:  No  History of parent with fractured hip:  No  Current Smoking:  No  Patient has been on glucocorticoids for more than 3 months (5mg/day or more): No  Rheumatoid Arthritis on Problem List:  No  Secondary Osteoporosis on Problem List:  No  Consumes 3 or more units of alcohol per day: No  Femoral Neck BMD (g/cm2)           Reviewed and updated as needed this visit by Provider   Tobacco  Allergies  Meds  Problems  Med Hx  Surg Hx  Fam Hx            Past Medical History:   Diagnosis Date    Alcoholic (H)     recovering sober since     Anxiety     sees NP psychiatry in [place].    Hyperlipidemia LDL goal <160      (normal spontaneous vaginal delivery)      Past Surgical History:   Procedure Laterality Date    ARTHROSCOPY KNEE RT/LT      right    COLONOSCOPY N/A 2024    Procedure: COLONOSCOPY, FLEXIBLE, WITH LESION REMOVAL USING SNARE;  Surgeon: Paul Gonzalez DO;  Location: [facility] OR    COLONOSCOPY WITH CO2 INSUFFLATION N/A 2024    Procedure: Colonoscopy with CO2 insufflation;  Surgeon: Paul Gonzalez DO;  Location: [facility] OR    HYSTERECTOMY, PAP NO LONGER INDICATED      Menorrhagia    HYSTERECTOMY, VAGINAL      partial. no cancer.    TONSILLECTOMY & ADENOIDECTOMY      ZZC NONSPECIFIC PROCEDURE      elbow fracture ORIF    ZZ NONSPECIFIC PROCEDURE  2002    cyst removal Left forearm     OB History    Para Term  AB Living   2 2 0 0 0 2   SAB IAB Ectopic Multiple Live Births   0 0 0 0 0      # Outcome Date GA Lbr Ced/2nd Weight Sex Type Anes PTL Lv   2 Para            1 Para               Lab work is in process  Labs reviewed in EPIC  BP Readings from Last 3 Encounters:   25 (!) 154/87   24 120/79   24 121/69    Wt Readings from Last 3 Encounters:   25 99.3 kg (219 lb)   24 96.6 kg (213 lb)   10/31/23 97.5 kg (215 lb)                  Patient Active Problem List   Diagnosis    Hyperlipidemia LDL goal <160    Major depression in remission    Anxiety    Elevated blood pressure reading without diagnosis of hypertension    Alcohol dependence in remission (H)    Positive colorectal cancer screening using Cologuard test    Tubular adenoma of colon     Past Surgical History:   Procedure Laterality Date    ARTHROSCOPY KNEE RT/LT      right    COLONOSCOPY N/A 2024    Procedure: COLONOSCOPY, FLEXIBLE, WITH LESION REMOVAL USING SNARE;  Surgeon: Paul Gonzalez DO;  Location: [facility] OR    COLONOSCOPY WITH CO2 INSUFFLATION N/A 2024    Procedure: Colonoscopy with CO2 insufflation;  Surgeon: Paul Gonzalez DO;  Location: [facility] OR    HYSTERECTOMY, PAP NO LONGER INDICATED      Menorrhagia    HYSTERECTOMY, VAGINAL      partial. no cancer.    TONSILLECTOMY & ADENOIDECTOMY      [facility] NONSPECIFIC PROCEDURE      elbow fracture ORIF    [facility] NONSPECIFIC PROCEDURE      cyst removal Left forearm       Social History     Tobacco Use    Smoking status: Former     Current packs/day: 0.00     Average packs/day: 1 pack/day for 14.0 years (14.0 ttl pk-yrs)     Types: Cigarettes     Start date: 2003     Quit date: 2017     Years since quittin.1     Passive exposure: Past    Smokeless tobacco: Former    Tobacco comments:     9/1/10   Substance Use Topics    Alcohol use: No     Alcohol/week: 0.0 standard drinks of alcohol     Comment: history of abuse      Family History   Problem Relation Age of Onset    Hypertension Mother     Diabetes Mother     Depression Mother     Lipids Mother     Osteoporosis Mother     Hypertension Father     Diabetes  Father     Lipids Father     Diabetes Maternal Grandmother     Cancer Paternal Grandfather         BONE    Allergies Son     Asthma Son     Allergies Daughter          Current Outpatient Medications   Medication Sig Dispense Refill    buPROPion (WELLBUTRIN SR) 100 MG 12 hr tablet Take 200 mg by mouth daily at 2 pm      BUSPAR 30 MG OR TABS 1 TABLET TWICE DAILY      losartan (COZAAR) 25 MG tablet Take 1 tablet (25 mg) by mouth daily. 30 tablet 0    MULTIVITAMIN TABS   OR 1 TABLET DAILY      PARoxetine (PAXIL) 30 MG tablet Take 2 tablets by mouth daily at 2 pm      semaglutide-weight management (WEGOVY) 0.25 MG/0.5ML pen Inject 0.5 mLs (0.25 mg) subcutaneously once a week. 2 mL 0    simvastatin (ZOCOR) 20 MG tablet Take 1 tablet (20 mg) by mouth at bedtime. 90 tablet 3     No Known Allergies  Recent Labs   Lab Test 03/04/25  1730 01/17/24  1243 04/07/22  0821 10/21/20  0659   A1C 5.6 6.1*  --   --    LDL  --  71 108* 111*   HDL  --  37* 53 56   TRIG  --  125 142 138   TSH  --   --  1.21  --                Review of Systems  CONSTITUTIONAL: NEGATIVE for fever, chills, change in weight  INTEGUMENTARY/SKIN: NEGATIVE for worrisome rashes, moles or lesions  EYES: NEGATIVE for vision changes or irritation  ENT/MOUTH: NEGATIVE for ear, mouth and throat problems  RESP: NEGATIVE for significant cough or SOB  BREAST: NEGATIVE for masses, tenderness or discharge  CV: NEGATIVE for chest pain, palpitations or peripheral edema  GI: NEGATIVE for nausea, abdominal pain, heartburn, or change in bowel habits  : NEGATIVE for frequency, dysuria, or hematuria  MUSCULOSKELETAL: NEGATIVE for significant arthralgias or myalgia  NEURO: NEGATIVE for weakness, dizziness or paresthesias  ENDOCRINE: NEGATIVE for temperature intolerance, skin/hair changes  HEME: NEGATIVE for bleeding problems  PSYCHIATRIC: NEGATIVE for changes in mood or affect     Objective    Exam  BP (!) 154/87   Pulse 74   Temp 98.5  F (36.9  C) (Temporal)   Resp 14   Ht  "1.677 m (5' 6.02\")   Wt 99.3 kg (219 lb)   LMP 12/20/2010 (Exact Date)   SpO2 97%   BMI 35.32 kg/m     Estimated body mass index is 35.32 kg/m  as calculated from the following:    Height as of this encounter: 1.677 m (5' 6.02\").    Weight as of this encounter: 99.3 kg (219 lb).  Repeat /98  Physical Exam  GENERAL: alert and no distress  EYES: Eyes grossly normal to inspection, PERRL and conjunctivae and sclerae normal  HENT: ear canals and TM's normal, nose and mouth without ulcers or lesions  NECK: no adenopathy, no asymmetry, masses, or scars  RESP: lungs clear to auscultation - no rales, rhonchi or wheezes  BREAST: normal without masses, tenderness or nipple discharge and no palpable axillary masses or adenopathy  CV: regular rate and rhythm, normal S1 S2, no S3 or S4, no murmur, click or rub, no peripheral edema  ABDOMEN: soft, nontender, no hepatosplenomegaly, no masses and bowel sounds normal  MS: no gross musculoskeletal defects noted, no edema  SKIN: no suspicious lesions or rashes  NEURO: Normal strength and tone, mentation intact and speech normal  PSYCH: mentation appears normal, affect normal/bright  LYMPH: no cervical, supraclavicular, axillary, or inguinal adenopathy        Signed Electronically by: Danika Hammond MD    "

## 2025-03-05 ENCOUNTER — TELEPHONE (OUTPATIENT)
Dept: FAMILY MEDICINE | Facility: CLINIC | Age: 60
End: 2025-03-05
Payer: COMMERCIAL

## 2025-03-05 ENCOUNTER — PATIENT OUTREACH (OUTPATIENT)
Dept: GASTROENTEROLOGY | Facility: CLINIC | Age: 60
End: 2025-03-05
Payer: COMMERCIAL

## 2025-03-05 DIAGNOSIS — R73.03 PREDIABETES: Primary | ICD-10-CM

## 2025-03-05 PROBLEM — I10 HYPERTENSION GOAL BP (BLOOD PRESSURE) < 140/90: Status: ACTIVE | Noted: 2025-03-05

## 2025-03-05 PROBLEM — R06.83 SNORING: Status: ACTIVE | Noted: 2025-03-05

## 2025-03-05 PROBLEM — E66.01 CLASS 2 SEVERE OBESITY DUE TO EXCESS CALORIES WITH SERIOUS COMORBIDITY AND BODY MASS INDEX (BMI) OF 35.0 TO 35.9 IN ADULT (H): Status: ACTIVE | Noted: 2025-03-05

## 2025-03-05 PROBLEM — E66.812 CLASS 2 SEVERE OBESITY DUE TO EXCESS CALORIES WITH SERIOUS COMORBIDITY AND BODY MASS INDEX (BMI) OF 35.0 TO 35.9 IN ADULT (H): Status: ACTIVE | Noted: 2025-03-05

## 2025-03-05 LAB
ALBUMIN SERPL BCG-MCNC: 4.6 G/DL (ref 3.5–5.2)
ALP SERPL-CCNC: 99 U/L (ref 40–150)
ALT SERPL W P-5'-P-CCNC: 22 U/L (ref 0–50)
ANION GAP SERPL CALCULATED.3IONS-SCNC: 11 MMOL/L (ref 7–15)
AST SERPL W P-5'-P-CCNC: 20 U/L (ref 0–45)
BILIRUB SERPL-MCNC: 0.2 MG/DL
BUN SERPL-MCNC: 25.8 MG/DL (ref 8–23)
CALCIUM SERPL-MCNC: 9.9 MG/DL (ref 8.8–10.4)
CHLORIDE SERPL-SCNC: 104 MMOL/L (ref 98–107)
CHOLEST SERPL-MCNC: 194 MG/DL
CREAT SERPL-MCNC: 1.32 MG/DL (ref 0.51–0.95)
EGFRCR SERPLBLD CKD-EPI 2021: 46 ML/MIN/1.73M2
FASTING STATUS PATIENT QL REPORTED: NO
GLUCOSE SERPL-MCNC: 101 MG/DL (ref 70–99)
GLUCOSE SERPL-MCNC: 101 MG/DL (ref 70–99)
HCO3 SERPL-SCNC: 27 MMOL/L (ref 22–29)
HDLC SERPL-MCNC: 52 MG/DL
LDLC SERPL CALC-MCNC: 115 MG/DL
NONHDLC SERPL-MCNC: 142 MG/DL
POTASSIUM SERPL-SCNC: 4.6 MMOL/L (ref 3.4–5.3)
PROT SERPL-MCNC: 7.3 G/DL (ref 6.4–8.3)
SODIUM SERPL-SCNC: 142 MMOL/L (ref 135–145)
TRIGL SERPL-MCNC: 133 MG/DL

## 2025-03-05 RX ORDER — METFORMIN HYDROCHLORIDE 500 MG/1
500 TABLET, EXTENDED RELEASE ORAL
Qty: 30 TABLET | Refills: 11 | Status: SHIPPED | OUTPATIENT
Start: 2025-03-05 | End: 2025-03-05

## 2025-03-05 NOTE — TELEPHONE ENCOUNTER
"Routing to Dr. Hammond    Patient sent in a Rounds message stating: \"According to my glucose and A1C labs my results are normal.  Would I still need that medication?\" Hgb A1C from yesterday is 5.6. Please advise if you still recommend metformin at this time.    Sherrie ORLANDO RN  [facility]   "

## 2025-03-05 NOTE — TELEPHONE ENCOUNTER
RN called patient and relayed provider's message. Patient verbalized understanding.     Pt stated that she will calling her insurance to see if other GLP-1 medications are covered. Pt is going to think about the wt management program.     RN gave the number to the clinic to call, if she questions or concerns.     Cleopatra Crockett RN  [facility]

## 2025-03-05 NOTE — TELEPHONE ENCOUNTER
PRIOR AUTHORIZATION DENIED    Medication: WEGOVY 0.25 MG/0.5ML SC SOAJ  Insurance Company: [employer] - Phone [phone] Fax [phone]  Denial Date: 3/5/2025  Denial Reason(s): Plan exclusion      Appeal Information: NA  Patient Notified: No

## 2025-03-05 NOTE — TELEPHONE ENCOUNTER
Hi  I just saw A1c  It is better  She can Hold off on metformin  She should check with her Insurance about GLP when as this might help with weight Loss  Other options wt management Program

## 2025-03-05 NOTE — TELEPHONE ENCOUNTER
Please call  Insurance denied wegovy  Take Metformin daily for High sugars  See Prediabetic educator  Metformin can cause Diarrhea which does get better in most patients

## 2025-03-06 RX ORDER — SIMVASTATIN 40 MG
40 TABLET ORAL AT BEDTIME
Qty: 90 TABLET | Refills: 3 | Status: SHIPPED | OUTPATIENT
Start: 2025-03-06

## 2025-03-10 ENCOUNTER — TELEPHONE (OUTPATIENT)
Dept: FAMILY MEDICINE | Facility: CLINIC | Age: 60
End: 2025-03-10
Payer: COMMERCIAL

## 2025-03-10 DIAGNOSIS — E78.5 HYPERLIPIDEMIA LDL GOAL <160: Primary | ICD-10-CM

## 2025-03-10 NOTE — TELEPHONE ENCOUNTER
Patient Returning Call    Reason for call:  Patient has questions regarding last visit with PCP. Would like clarification on medications prescribed and the abnormal test results regarding kidneys.     Information relayed to patient:  n/a    Patient has additional questions:  No      Could we send this information to you in [employer] or would you prefer to receive a phone call?:   Patient would prefer a phone call   Okay to leave a detailed message?: Yes at Home number on file [phone] (home)

## 2025-03-11 RX ORDER — SIMVASTATIN 20 MG
20 TABLET ORAL AT BEDTIME
Qty: 90 TABLET | Refills: 1 | Status: SHIPPED | OUTPATIENT
Start: 2025-03-11 | End: 2025-03-11

## 2025-03-11 RX ORDER — SIMVASTATIN 20 MG
20 TABLET ORAL AT BEDTIME
Qty: 90 TABLET | Refills: 1 | Status: SHIPPED | OUTPATIENT
Start: 2025-03-11

## 2025-03-11 RX ORDER — SIMVASTATIN 40 MG
40 TABLET ORAL AT BEDTIME
Qty: 1 TABLET | Refills: 0 | Status: SHIPPED | OUTPATIENT
Start: 2025-03-11 | End: 2025-03-11

## 2025-03-11 NOTE — TELEPHONE ENCOUNTER
"Patient calling about this message.      I see lab result note:      Ms. Regalado,     Your LDL (bad cholesterol)  was above goal.  Genetics, diet, weight and low exercise levels can contribute to this. I suggest that you increase your zocor  dose to 40 mg  mg a day and recheck fasting labs in 3 months. I will send a prescription to your pharmacy.  A1c is a test which indicates how well your blood sugar has been controlled over the last 6 weeks.  This indicates that your average blood sugar over the last 6 weeks is is Good. You should recheck your A1c in 3 months.  Your kidney function was slightly abnormal.  This should be rechecked every 6 months.  Controlling Blood Pressure will help and cozaar will help     Please contact the clinic if you have additional questions.  Thank you.     Sincerely,     Danika Hammond MD      Patient says she's actually been taking 1/2 of a 20 mg simvastatin daily, was never told to increase to 20.   So, should probably go to 20 mg daily now, right?    She is also a bit concerned about the \"abnormal kidney function\".    I reviewed the specific values.   She has started the losartan and will follow up as scheduled.    The only real issue is the simvastatin dosing.    Routed to PCP to address; we assume the dose will be sent as 20 mg now.  Pharmacy selected.    Patient knows to call us or pharmacy if she does not hear back in the next couple of days.    Samira KEARNS RN  [facility] Triage        "

## 2025-03-12 ENCOUNTER — TELEPHONE (OUTPATIENT)
Dept: FAMILY MEDICINE | Facility: CLINIC | Age: 60
End: 2025-03-12
Payer: COMMERCIAL

## 2025-03-12 NOTE — TELEPHONE ENCOUNTER
Manjula calling for clarification on simvastatin dosage. See 3/10/25 TE provider verified 20mg is the correct dosage.     Mariah Schaffer RN

## 2025-03-12 NOTE — TELEPHONE ENCOUNTER
Called patient. Left voice message to return call at [phone].    CONCHIS Cheatham RN  [facility]

## 2025-03-13 NOTE — TELEPHONE ENCOUNTER
Patient returned call. RN relayed provider's message. Patient verbalized understanding.     Sherrie ORLANDO RN  [facility]

## 2025-03-13 NOTE — TELEPHONE ENCOUNTER
Attempt #2 to call patient.     RN left voicemail and requested return call to [facility] at [phone] and ask to speak to a nurse.     Cleopatra Crockett RN   [facility]

## 2025-03-25 ENCOUNTER — OFFICE VISIT (OUTPATIENT)
Dept: FAMILY MEDICINE | Facility: CLINIC | Age: 60
End: 2025-03-25
Attending: FAMILY MEDICINE
Payer: COMMERCIAL

## 2025-03-25 VITALS
HEART RATE: 85 BPM | OXYGEN SATURATION: 98 % | BODY MASS INDEX: 34.55 KG/M2 | WEIGHT: 215 LBS | TEMPERATURE: 97.8 F | RESPIRATION RATE: 16 BRPM | SYSTOLIC BLOOD PRESSURE: 132 MMHG | HEIGHT: 66 IN | DIASTOLIC BLOOD PRESSURE: 80 MMHG

## 2025-03-25 DIAGNOSIS — L30.9 ECZEMA, UNSPECIFIED TYPE: ICD-10-CM

## 2025-03-25 DIAGNOSIS — E78.5 HYPERLIPIDEMIA LDL GOAL <100: Primary | ICD-10-CM

## 2025-03-25 DIAGNOSIS — I10 HYPERTENSION GOAL BP (BLOOD PRESSURE) < 140/90: ICD-10-CM

## 2025-03-25 PROCEDURE — 36415 COLL VENOUS BLD VENIPUNCTURE: CPT | Performed by: FAMILY MEDICINE

## 2025-03-25 PROCEDURE — 84132 ASSAY OF SERUM POTASSIUM: CPT | Performed by: FAMILY MEDICINE

## 2025-03-25 PROCEDURE — G2211 COMPLEX E/M VISIT ADD ON: HCPCS | Performed by: FAMILY MEDICINE

## 2025-03-25 PROCEDURE — 3079F DIAST BP 80-89 MM HG: CPT | Performed by: FAMILY MEDICINE

## 2025-03-25 PROCEDURE — 3075F SYST BP GE 130 - 139MM HG: CPT | Performed by: FAMILY MEDICINE

## 2025-03-25 PROCEDURE — 99213 OFFICE O/P EST LOW 20 MIN: CPT | Performed by: FAMILY MEDICINE

## 2025-03-25 PROCEDURE — 1126F AMNT PAIN NOTED NONE PRSNT: CPT | Performed by: FAMILY MEDICINE

## 2025-03-25 RX ORDER — LOSARTAN POTASSIUM 25 MG/1
25 TABLET ORAL DAILY
Qty: 90 TABLET | Refills: 3 | Status: SHIPPED | OUTPATIENT
Start: 2025-03-25

## 2025-03-25 RX ORDER — TRIAMCINOLONE ACETONIDE 1 MG/G
CREAM TOPICAL 2 TIMES DAILY
Qty: 45 G | Refills: 0 | Status: SHIPPED | OUTPATIENT
Start: 2025-03-25

## 2025-03-25 ASSESSMENT — PAIN SCALES - GENERAL: PAINLEVEL_OUTOF10: NO PAIN (0)

## 2025-03-25 NOTE — PROGRESS NOTES
"  Assessment & Plan     Hypertension goal BP (blood pressure) < 140/90  Stable   - losartan (COZAAR) 25 MG tablet; Take 1 tablet (25 mg) by mouth daily.  - Potassium; Future    Eczema, unspecified type  Advised   - triamcinolone (KENALOG) 0.1 % external cream; Apply topically 2 times daily.  CKD discussed with pt will check labs in 2 month  She is taking statins and doing well  Hyperlipidemia -on statins  The longitudinal plan of care for the diagnosis(es)/condition(s) as documented were addressed during this visit. Due to the added complexity in care, I will continue to support Yessica in the subsequent management and with ongoing continuity of care.      Joe Juan is a 60 year old, presenting for the following health issues:  Recheck Medication      3/25/2025     3:48 PM   Additional Questions   Roomed by Carli     History of Present Illness       Hypertension: She presents for follow up of hypertension.  She does not check blood pressure  regularly outside of the clinic. Outpatient blood pressures have not been over 140/90. She does not follow a low salt diet.     She eats 2-3 servings of fruits and vegetables daily.She consumes 0 sweetened beverage(s) daily.She exercises with enough effort to increase her heart rate 20 to 29 minutes per day.  She exercises with enough effort to increase her heart rate 5 days per week.   She is taking medications regularly.            Review of Systems  CONSTITUTIONAL: NEGATIVE for fever, chills, change in weight  ENT/MOUTH: NEGATIVE for ear, mouth and throat problems  RESP: NEGATIVE for significant cough or SOB  CV: NEGATIVE for chest pain, palpitations or peripheral edema  ROS otherwise negative      Objective    /80   Pulse 85   Temp 97.8  F (36.6  C) (Temporal)   Resp 16   Ht 1.677 m (5' 6.02\")   Wt 97.5 kg (215 lb)   LMP 12/20/2010 (Exact Date)   SpO2 98%   BMI 34.68 kg/m    Body mass index is 34.68 kg/m .  Physical Exam   GENERAL: alert and no " distress  NECK: no adenopathy, no asymmetry, masses, or scars  RESP: lungs clear to auscultation - no rales, rhonchi or wheezes  CV: regular rate and rhythm, normal S1 S2, no S3 or S4, no murmur, click or rub, no peripheral edema  ABDOMEN: soft, nontender, no hepatosplenomegaly, no masses and bowel sounds normal  MS: no gross musculoskeletal defects noted, no edema    Pending   Office Visit on 03/04/2025   Component Date Value Ref Range Status    Cholesterol 03/04/2025 194  <200 mg/dL Final    Triglycerides 03/04/2025 133  <150 mg/dL Final    Direct Measure HDL 03/04/2025 52  >=50 mg/dL Final    LDL Cholesterol Calculated 03/04/2025 115 (H)  <100 mg/dL Final    Non HDL Cholesterol 03/04/2025 142 (H)  <130 mg/dL Final    Patient Fasting > 8hrs? 03/04/2025 No   Final    Estimated Average Glucose 03/04/2025 114  <117 mg/dL Final    Hemoglobin A1C 03/04/2025 5.6  0.0 - 5.6 % Final    Normal <5.7%   Prediabetes 5.7-6.4%    Diabetes 6.5% or higher     Note: Adopted from ADA consensus guidelines.    Glucose 03/04/2025 101 (H)  70 - 99 mg/dL Final    Patient Fasting > 8hrs? 03/04/2025 No   Final    Sodium 03/04/2025 142  135 - 145 mmol/L Final    Potassium 03/04/2025 4.6  3.4 - 5.3 mmol/L Final    Carbon Dioxide (CO2) 03/04/2025 27  22 - 29 mmol/L Final    Anion Gap 03/04/2025 11  7 - 15 mmol/L Final    Urea Nitrogen 03/04/2025 25.8 (H)  8.0 - 23.0 mg/dL Final    Creatinine 03/04/2025 1.32 (H)  0.51 - 0.95 mg/dL Final    GFR Estimate 03/04/2025 46 (L)  >60 mL/min/1.73m2 Final    eGFR calculated using 2021 CKD-EPI equation.    Calcium 03/04/2025 9.9  8.8 - 10.4 mg/dL Final    Chloride 03/04/2025 104  98 - 107 mmol/L Final    Glucose 03/04/2025 101 (H)  70 - 99 mg/dL Final    Alkaline Phosphatase 03/04/2025 99  40 - 150 U/L Final    AST 03/04/2025 20  0 - 45 U/L Final    ALT 03/04/2025 22  0 - 50 U/L Final    Protein Total 03/04/2025 7.3  6.4 - 8.3 g/dL Final    Albumin 03/04/2025 4.6  3.5 - 5.2 g/dL Final    Bilirubin Total  03/04/2025 0.2  <=1.2 mg/dL Final    Patient Fasting > 8hrs? 03/04/2025 No   Final           Signed Electronically by: Danika Hammond MD

## 2025-03-26 LAB — POTASSIUM SERPL-SCNC: 4.2 MMOL/L (ref 3.4–5.3)

## 2025-06-19 PROBLEM — R19.5 POSITIVE COLORECTAL CANCER SCREENING USING COLOGUARD TEST: Status: RESOLVED | Noted: 2024-01-08 | Resolved: 2025-06-19

## 2025-08-26 ENCOUNTER — PATIENT OUTREACH (OUTPATIENT)
Dept: CARE COORDINATION | Facility: CLINIC | Age: 60
End: 2025-08-26
Payer: COMMERCIAL

## (undated) DEVICE — PAD CHUX UNDERPAD 23X24" 7136

## (undated) DEVICE — KIT ENDO FIRST STEP DISINFECTANT 200ML W/POUCH EP-4

## (undated) RX ORDER — FENTANYL CITRATE 50 UG/ML
INJECTION, SOLUTION INTRAMUSCULAR; INTRAVENOUS
Status: DISPENSED
Start: 2024-01-16